# Patient Record
Sex: MALE | Race: OTHER | Employment: OTHER | URBAN - METROPOLITAN AREA
[De-identification: names, ages, dates, MRNs, and addresses within clinical notes are randomized per-mention and may not be internally consistent; named-entity substitution may affect disease eponyms.]

---

## 2017-01-09 ENCOUNTER — ANESTHESIA EVENT (OUTPATIENT)
Dept: CARDIAC SURGERY | Facility: HOSPITAL | Age: 52
DRG: 269 | End: 2017-01-09
Payer: COMMERCIAL

## 2017-01-09 ENCOUNTER — HOSPITAL ENCOUNTER (INPATIENT)
Facility: HOSPITAL | Age: 52
LOS: 2 days | Discharge: HOME | DRG: 269 | End: 2017-01-11
Attending: EMERGENCY MEDICINE | Admitting: SURGERY
Payer: COMMERCIAL

## 2017-01-09 ENCOUNTER — ANESTHESIA (OUTPATIENT)
Dept: CARDIAC SURGERY | Facility: HOSPITAL | Age: 52
DRG: 269 | End: 2017-01-09
Payer: COMMERCIAL

## 2017-01-09 ENCOUNTER — SURGERY (OUTPATIENT)
Age: 52
End: 2017-01-09

## 2017-01-09 ENCOUNTER — APPOINTMENT (OUTPATIENT)
Dept: ULTRASOUND IMAGING | Facility: HOSPITAL | Age: 52
DRG: 269 | End: 2017-01-09
Attending: EMERGENCY MEDICINE
Payer: COMMERCIAL

## 2017-01-09 ENCOUNTER — APPOINTMENT (OUTPATIENT)
Dept: CT IMAGING | Facility: HOSPITAL | Age: 52
DRG: 269 | End: 2017-01-09
Attending: EMERGENCY MEDICINE
Payer: COMMERCIAL

## 2017-01-09 ENCOUNTER — APPOINTMENT (OUTPATIENT)
Dept: GENERAL RADIOLOGY | Facility: HOSPITAL | Age: 52
DRG: 269 | End: 2017-01-09
Attending: EMERGENCY MEDICINE
Payer: COMMERCIAL

## 2017-01-09 DIAGNOSIS — M54.9 INTRACTABLE BACK PAIN: ICD-10-CM

## 2017-01-09 DIAGNOSIS — M54.16 LUMBAR RADICULOPATHY: Primary | ICD-10-CM

## 2017-01-09 DIAGNOSIS — I71.4 ABDOMINAL AORTIC ANEURYSM (AAA) WITHOUT RUPTURE (HCC): ICD-10-CM

## 2017-01-09 PROBLEM — I71.40 ABDOMINAL AORTIC ANEURYSM (AAA) WITHOUT RUPTURE: Status: ACTIVE | Noted: 2017-01-09

## 2017-01-09 PROBLEM — T88.4XXA DIFFICULT INTUBATION: Status: ACTIVE | Noted: 2017-01-09

## 2017-01-09 LAB
ALBUMIN SERPL-MCNC: 3.6 G/DL (ref 3.5–4.8)
ALP LIVER SERPL-CCNC: 75 U/L (ref 45–117)
ALT SERPL-CCNC: 35 U/L (ref 17–63)
ANTIBODY SCREEN: NEGATIVE
AST SERPL-CCNC: 20 U/L (ref 15–41)
BASOPHILS # BLD AUTO: 0.09 X10(3) UL (ref 0–0.1)
BASOPHILS NFR BLD AUTO: 1.2 %
BILIRUB SERPL-MCNC: 0.4 MG/DL (ref 0.1–2)
BUN BLD-MCNC: 15 MG/DL (ref 8–20)
CALCIUM BLD-MCNC: 9.2 MG/DL (ref 8.3–10.3)
CHLORIDE: 104 MMOL/L (ref 101–111)
CO2: 25 MMOL/L (ref 22–32)
CREAT BLD-MCNC: 0.94 MG/DL (ref 0.7–1.3)
EOSINOPHIL # BLD AUTO: 0.18 X10(3) UL (ref 0–0.3)
EOSINOPHIL NFR BLD AUTO: 2.4 %
ERYTHROCYTE [DISTWIDTH] IN BLOOD BY AUTOMATED COUNT: 13 % (ref 11.5–16)
GLUCOSE BLD-MCNC: 127 MG/DL (ref 70–99)
HCT VFR BLD AUTO: 48.2 % (ref 37–53)
HGB BLD-MCNC: 16.3 G/DL (ref 13–17)
IMMATURE GRANULOCYTE COUNT: 0.02 X10(3) UL (ref 0–1)
IMMATURE GRANULOCYTE RATIO %: 0.3 %
LYMPHOCYTES # BLD AUTO: 1.78 X10(3) UL (ref 0.9–4)
LYMPHOCYTES NFR BLD AUTO: 23.8 %
M PROTEIN MFR SERPL ELPH: 7 G/DL (ref 6.1–8.3)
MCH RBC QN AUTO: 32 PG (ref 27–33.2)
MCHC RBC AUTO-ENTMCNC: 33.8 G/DL (ref 31–37)
MCV RBC AUTO: 94.5 FL (ref 80–99)
MONOCYTES # BLD AUTO: 0.6 X10(3) UL (ref 0.1–0.6)
MONOCYTES NFR BLD AUTO: 8 %
NEUTROPHIL ABS PRELIM: 4.82 X10 (3) UL (ref 1.3–6.7)
NEUTROPHILS # BLD AUTO: 4.82 X10(3) UL (ref 1.3–6.7)
NEUTROPHILS NFR BLD AUTO: 64.3 %
PLATELET # BLD AUTO: 202 10(3)UL (ref 150–450)
POTASSIUM SERPL-SCNC: 3.7 MMOL/L (ref 3.6–5.1)
RBC # BLD AUTO: 5.1 X10(6)UL (ref 4.3–5.7)
RED CELL DISTRIBUTION WIDTH-SD: 45.1 FL (ref 35.1–46.3)
RH BLOOD TYPE: POSITIVE
SODIUM SERPL-SCNC: 136 MMOL/L (ref 136–144)
WBC # BLD AUTO: 7.5 X10(3) UL (ref 4–13)

## 2017-01-09 PROCEDURE — 86920 COMPATIBILITY TEST SPIN: CPT

## 2017-01-09 PROCEDURE — 93010 ELECTROCARDIOGRAM REPORT: CPT | Performed by: INTERNAL MEDICINE

## 2017-01-09 PROCEDURE — 93971 EXTREMITY STUDY: CPT

## 2017-01-09 PROCEDURE — 86850 RBC ANTIBODY SCREEN: CPT | Performed by: SURGERY

## 2017-01-09 PROCEDURE — 96376 TX/PRO/DX INJ SAME DRUG ADON: CPT

## 2017-01-09 PROCEDURE — 80053 COMPREHEN METABOLIC PANEL: CPT | Performed by: EMERGENCY MEDICINE

## 2017-01-09 PROCEDURE — 86901 BLOOD TYPING SEROLOGIC RH(D): CPT | Performed by: SURGERY

## 2017-01-09 PROCEDURE — 99285 EMERGENCY DEPT VISIT HI MDM: CPT

## 2017-01-09 PROCEDURE — 93005 ELECTROCARDIOGRAM TRACING: CPT

## 2017-01-09 PROCEDURE — 74177 CT ABD & PELVIS W/CONTRAST: CPT

## 2017-01-09 PROCEDURE — 04V03DZ RESTRICTION OF ABDOMINAL AORTA WITH INTRALUMINAL DEVICE, PERCUTANEOUS APPROACH: ICD-10-PCS | Performed by: SURGERY

## 2017-01-09 PROCEDURE — 86900 BLOOD TYPING SEROLOGIC ABO: CPT | Performed by: SURGERY

## 2017-01-09 PROCEDURE — 72110 X-RAY EXAM L-2 SPINE 4/>VWS: CPT

## 2017-01-09 PROCEDURE — 85025 COMPLETE CBC W/AUTO DIFF WBC: CPT | Performed by: EMERGENCY MEDICINE

## 2017-01-09 PROCEDURE — 96374 THER/PROPH/DIAG INJ IV PUSH: CPT

## 2017-01-09 PROCEDURE — 96375 TX/PRO/DX INJ NEW DRUG ADDON: CPT

## 2017-01-09 DEVICE — ARISTA AH ABSORBABLE HEMOSTATIC PARTICLES
Type: IMPLANTABLE DEVICE | Status: FUNCTIONAL
Brand: ARISTA™ AH

## 2017-01-09 RX ORDER — HYDROMORPHONE HYDROCHLORIDE 1 MG/ML
0.5 INJECTION, SOLUTION INTRAMUSCULAR; INTRAVENOUS; SUBCUTANEOUS EVERY 30 MIN PRN
Status: ACTIVE | OUTPATIENT
Start: 2017-01-09 | End: 2017-01-09

## 2017-01-09 RX ORDER — BISACODYL 10 MG
10 SUPPOSITORY, RECTAL RECTAL
Status: DISCONTINUED | OUTPATIENT
Start: 2017-01-09 | End: 2017-01-11

## 2017-01-09 RX ORDER — METHYLPREDNISOLONE 4 MG/1
4 TABLET ORAL
Status: DISCONTINUED | OUTPATIENT
Start: 2017-01-10 | End: 2017-01-11

## 2017-01-09 RX ORDER — POLYETHYLENE GLYCOL 3350 17 G/17G
17 POWDER, FOR SOLUTION ORAL DAILY PRN
Status: DISCONTINUED | OUTPATIENT
Start: 2017-01-09 | End: 2017-01-11

## 2017-01-09 RX ORDER — DOCUSATE SODIUM 100 MG/1
100 CAPSULE, LIQUID FILLED ORAL 2 TIMES DAILY
Status: DISCONTINUED | OUTPATIENT
Start: 2017-01-09 | End: 2017-01-11

## 2017-01-09 RX ORDER — SODIUM PHOSPHATE, DIBASIC AND SODIUM PHOSPHATE, MONOBASIC 7; 19 G/133ML; G/133ML
1 ENEMA RECTAL ONCE AS NEEDED
Status: ACTIVE | OUTPATIENT
Start: 2017-01-09 | End: 2017-01-09

## 2017-01-09 RX ORDER — SODIUM CHLORIDE 9 MG/ML
INJECTION, SOLUTION INTRAVENOUS ONCE
Status: DISCONTINUED | OUTPATIENT
Start: 2017-01-09 | End: 2017-01-11

## 2017-01-09 RX ORDER — HYDROCODONE BITARTRATE AND ACETAMINOPHEN 5; 325 MG/1; MG/1
2 TABLET ORAL EVERY 4 HOURS PRN
Status: DISCONTINUED | OUTPATIENT
Start: 2017-01-09 | End: 2017-01-11

## 2017-01-09 RX ORDER — HYDROMORPHONE HYDROCHLORIDE 1 MG/ML
1 INJECTION, SOLUTION INTRAMUSCULAR; INTRAVENOUS; SUBCUTANEOUS EVERY 30 MIN PRN
Status: COMPLETED | OUTPATIENT
Start: 2017-01-09 | End: 2017-01-09

## 2017-01-09 RX ORDER — METHYLPREDNISOLONE 4 MG/1
8 TABLET ORAL
Status: COMPLETED | OUTPATIENT
Start: 2017-01-09 | End: 2017-01-09

## 2017-01-09 RX ORDER — CYCLOBENZAPRINE HCL 10 MG
10 TABLET ORAL 3 TIMES DAILY PRN
Status: DISCONTINUED | OUTPATIENT
Start: 2017-01-09 | End: 2017-01-11

## 2017-01-09 RX ORDER — ONDANSETRON 2 MG/ML
4 INJECTION INTRAMUSCULAR; INTRAVENOUS EVERY 4 HOURS PRN
Status: DISCONTINUED | OUTPATIENT
Start: 2017-01-09 | End: 2017-01-10

## 2017-01-09 RX ORDER — CEFAZOLIN SODIUM 1 G/3ML
INJECTION, POWDER, FOR SOLUTION INTRAMUSCULAR; INTRAVENOUS
Status: DISCONTINUED | OUTPATIENT
Start: 2017-01-09 | End: 2017-01-10 | Stop reason: HOSPADM

## 2017-01-09 RX ORDER — IBUPROFEN 400 MG/1
400 TABLET ORAL EVERY 6 HOURS PRN
COMMUNITY
End: 2017-02-24 | Stop reason: DRUGHIGH

## 2017-01-09 RX ORDER — METHYLPREDNISOLONE 4 MG/1
4 TABLET ORAL
Status: DISCONTINUED | OUTPATIENT
Start: 2017-01-09 | End: 2017-01-11

## 2017-01-09 RX ORDER — HYDROCODONE BITARTRATE AND ACETAMINOPHEN 5; 325 MG/1; MG/1
1 TABLET ORAL EVERY 4 HOURS PRN
Status: DISCONTINUED | OUTPATIENT
Start: 2017-01-09 | End: 2017-01-11

## 2017-01-09 RX ORDER — DIAZEPAM 5 MG/ML
5 INJECTION, SOLUTION INTRAMUSCULAR; INTRAVENOUS ONCE
Status: COMPLETED | OUTPATIENT
Start: 2017-01-09 | End: 2017-01-09

## 2017-01-09 RX ORDER — DEXAMETHASONE SODIUM PHOSPHATE 4 MG/ML
10 VIAL (ML) INJECTION ONCE
Status: COMPLETED | OUTPATIENT
Start: 2017-01-09 | End: 2017-01-09

## 2017-01-09 RX ORDER — METHYLPREDNISOLONE 4 MG/1
4 TABLET ORAL NIGHTLY
Status: DISCONTINUED | OUTPATIENT
Start: 2017-01-11 | End: 2017-01-11

## 2017-01-09 RX ORDER — METHYLPREDNISOLONE 4 MG/1
8 TABLET ORAL NIGHTLY
Status: DISCONTINUED | OUTPATIENT
Start: 2017-01-09 | End: 2017-01-11

## 2017-01-09 RX ORDER — ACETAMINOPHEN 325 MG/1
650 TABLET ORAL EVERY 4 HOURS PRN
Status: DISCONTINUED | OUTPATIENT
Start: 2017-01-09 | End: 2017-01-11

## 2017-01-09 RX ORDER — SODIUM CHLORIDE 9 MG/ML
INJECTION, SOLUTION INTRAVENOUS CONTINUOUS
Status: ACTIVE | OUTPATIENT
Start: 2017-01-09 | End: 2017-01-09

## 2017-01-09 RX ORDER — ENOXAPARIN SODIUM 100 MG/ML
70 INJECTION SUBCUTANEOUS DAILY
Status: DISCONTINUED | OUTPATIENT
Start: 2017-01-09 | End: 2017-01-09

## 2017-01-09 NOTE — ED INITIAL ASSESSMENT (HPI)
Pt c/o L leg pain starting yesterday, pt denies truama or injury, pt sts pain increasing through today. Denies numbness or tingling to lower extremity.

## 2017-01-09 NOTE — PROGRESS NOTES
[facility] Progress Note:  Anticoagulation Weight Dose Adjustment for enoxaparin (LOVENOX)    Abraham Littlejohn is a 46year old male who has been prescribed enoxaparin (LOVENOX) 40 mg sq q 24 hrs for VTE prophylaxis.       Estimated Creatinine Clearan

## 2017-01-09 NOTE — ED NOTES
Xray testing done and completed. Pt states he still has pain. Awaiting to go to ultrasound department.

## 2017-01-09 NOTE — ED PROVIDER NOTES
Patient Seen in: [facility] Emergency Department    History   Patient presents with:  Lower Extremity Injury (musculoskeletal)    Stated Complaint: left leg pain    HPI    Patient is a 53-year-old with a history of arthritis, hypertension who presen above.    PSFH elements reviewed from today and agreed except as otherwise stated in HPI.     Physical Exam       ED Triage Vitals   BP 01/09/17 1030 143/95 mmHg   Pulse 01/09/17 1030 80   Resp 01/09/17 1030 18   Temp 01/09/17 1030 97.8 °F (36.6 °C)   Temp lower extremity venous Doppler: No DVT. EXTREMITY EXAMINED:  Left  SAPHENOFEMORAL JUNCTION:  No reflux. THROMBI:  None visible. COMPRESSION:  Normal compressibility, phasicity, and augmentation. OTHER:  Negative.     Lumbar spine: Osteoarthritic changes (primary encounter diagnosis)  Intractable back pain  Abdominal aortic aneurysm (AAA) without rupture ([facility] [address]    Disposition:  Admit    Follow-up:  No follow-up provider specified.     Medications Prescribed:  Current Discharge Medication List        Present

## 2017-01-10 LAB
APTT PPP: 30.4 SECONDS (ref 25–34)
ARTERIAL BLD GAS O2 SATURATION: 93 % (ref 92–100)
ARTERIAL BLD GAS O2 SATURATION: 94 % (ref 92–100)
ARTERIAL BLD GAS O2 SATURATION: 96 % (ref 92–100)
ARTERIAL BLOOD GAS BASE EXCESS: -3.2
ARTERIAL BLOOD GAS BASE EXCESS: -4
ARTERIAL BLOOD GAS BASE EXCESS: -4
ARTERIAL BLOOD GAS HCO3: 21.9 MEQ/L (ref 22–26)
ARTERIAL BLOOD GAS HCO3: 21.9 MEQ/L (ref 22–26)
ARTERIAL BLOOD GAS HCO3: 23.1 MEQ/L (ref 22–26)
ARTERIAL BLOOD GAS PCO2: 39 MM HG (ref 35–45)
ARTERIAL BLOOD GAS PCO2: 41 MM HG (ref 35–45)
ARTERIAL BLOOD GAS PCO2: 50 MM HG (ref 35–45)
ARTERIAL BLOOD GAS PH: 7.28 (ref 7.35–7.45)
ARTERIAL BLOOD GAS PH: 7.34 (ref 7.35–7.45)
ARTERIAL BLOOD GAS PH: 7.37 (ref 7.35–7.45)
ARTERIAL BLOOD GAS PO2: 74 MM HG (ref 80–105)
ARTERIAL BLOOD GAS PO2: 76 MM HG (ref 80–105)
ARTERIAL BLOOD GAS PO2: 97 MM HG (ref 80–105)
ATRIAL RATE: 70 BPM
ATRIAL RATE: 93 BPM
BASOPHILS # BLD AUTO: 0.02 X10(3) UL (ref 0–0.1)
BASOPHILS NFR BLD AUTO: 0.2 %
BUN BLD-MCNC: 17 MG/DL (ref 8–20)
CALCIUM BLD-MCNC: 7.5 MG/DL (ref 8.3–10.3)
CALCULATED O2 SATURATION: 92 % (ref 92–100)
CALCULATED O2 SATURATION: 95 % (ref 92–100)
CALCULATED O2 SATURATION: 97 % (ref 92–100)
CARBOXYHEMOGLOBIN: 1 % SAT (ref 0–3)
CARBOXYHEMOGLOBIN: 1.1 % SAT (ref 0–3)
CARBOXYHEMOGLOBIN: 1.2 % SAT (ref 0–3)
CHLORIDE: 105 MMOL/L (ref 101–111)
CO2: 23 MMOL/L (ref 22–32)
CREAT BLD-MCNC: 0.95 MG/DL (ref 0.7–1.3)
EOSINOPHIL # BLD AUTO: 0 X10(3) UL (ref 0–0.3)
EOSINOPHIL NFR BLD AUTO: 0 %
ERYTHROCYTE [DISTWIDTH] IN BLOOD BY AUTOMATED COUNT: 12.8 % (ref 11.5–16)
ERYTHROCYTE [DISTWIDTH] IN BLOOD BY AUTOMATED COUNT: 13.2 % (ref 11.5–16)
FIO2: 50 %
FIO2: 50 %
FIO2: 60 %
GLUCOSE BLD-MCNC: 115 MG/DL (ref 65–99)
GLUCOSE BLD-MCNC: 189 MG/DL (ref 70–99)
HCT VFR BLD AUTO: 41.5 % (ref 37–53)
HCT VFR BLD AUTO: 42.9 % (ref 37–53)
HGB BLD-MCNC: 14.5 G/DL (ref 13–17)
HGB BLD-MCNC: 14.5 G/DL (ref 13–17)
IMMATURE GRANULOCYTE COUNT: 0.09 X10(3) UL (ref 0–1)
IMMATURE GRANULOCYTE RATIO %: 0.7 %
INR BLD: 1.04 (ref 0.89–1.12)
LYMPHOCYTES # BLD AUTO: 0.99 X10(3) UL (ref 0.9–4)
LYMPHOCYTES NFR BLD AUTO: 7.6 %
MCH RBC QN AUTO: 32.6 PG (ref 27–33.2)
MCH RBC QN AUTO: 33.3 PG (ref 27–33.2)
MCHC RBC AUTO-ENTMCNC: 33.8 G/DL (ref 31–37)
MCHC RBC AUTO-ENTMCNC: 34.9 G/DL (ref 31–37)
MCV RBC AUTO: 95.2 FL (ref 80–99)
MCV RBC AUTO: 96.4 FL (ref 80–99)
METHEMOGLOBIN: 0.6 % SAT (ref 0.4–1.5)
MONOCYTES # BLD AUTO: 0.56 X10(3) UL (ref 0.1–0.6)
MONOCYTES NFR BLD AUTO: 4.3 %
NEUTROPHIL ABS PRELIM: 11.39 X10 (3) UL (ref 1.3–6.7)
NEUTROPHILS # BLD AUTO: 11.39 X10(3) UL (ref 1.3–6.7)
NEUTROPHILS NFR BLD AUTO: 87.2 %
P AXIS: 37 DEGREES
P AXIS: 47 DEGREES
P-R INTERVAL: 174 MS
P-R INTERVAL: 176 MS
PATIENT TEMPERATURE: 97.3 F
PATIENT TEMPERATURE: 97.6 F
PATIENT TEMPERATURE: 98.6 F
PEEP: 5 CM H2O
PLATELET # BLD AUTO: 161 10(3)UL (ref 150–450)
PLATELET # BLD AUTO: 169 10(3)UL (ref 150–450)
POTASSIUM SERPL-SCNC: 4.3 MMOL/L (ref 3.6–5.1)
PRESSURE SUPPORT: 5 CM H2O
PSA SERPL DL<=0.01 NG/ML-MCNC: 13.9 SECONDS (ref 12.3–14.8)
Q-T INTERVAL: 368 MS
Q-T INTERVAL: 414 MS
QRS DURATION: 110 MS
QRS DURATION: 98 MS
QTC CALCULATION (BEZET): 447 MS
QTC CALCULATION (BEZET): 457 MS
R AXIS: -16 DEGREES
R AXIS: -32 DEGREES
RBC # BLD AUTO: 4.36 X10(6)UL (ref 4.3–5.7)
RBC # BLD AUTO: 4.45 X10(6)UL (ref 4.3–5.7)
RED CELL DISTRIBUTION WIDTH-SD: 45 FL (ref 35.1–46.3)
RED CELL DISTRIBUTION WIDTH-SD: 46.5 FL (ref 35.1–46.3)
SODIUM SERPL-SCNC: 137 MMOL/L (ref 136–144)
T AXIS: -23 DEGREES
T AXIS: 10 DEGREES
TIDAL VOLUME: 600 ML
TIDAL VOLUME: 750 ML
TOTAL HEMOGLOBIN: 14.3 G/DL (ref 13.2–17.3)
TOTAL HEMOGLOBIN: 14.4 G/DL (ref 13.2–17.3)
TOTAL HEMOGLOBIN: 14.7 G/DL (ref 13.2–17.3)
VENT RATE: 12 /MIN
VENT RATE: 12 /MIN
VENTRICULAR RATE: 70 BPM
VENTRICULAR RATE: 93 BPM
WBC # BLD AUTO: 11 X10(3) UL (ref 4–13)
WBC # BLD AUTO: 13.1 X10(3) UL (ref 4–13)

## 2017-01-10 PROCEDURE — 97530 THERAPEUTIC ACTIVITIES: CPT

## 2017-01-10 PROCEDURE — 94660 CPAP INITIATION&MGMT: CPT

## 2017-01-10 PROCEDURE — 93010 ELECTROCARDIOGRAM REPORT: CPT | Performed by: INTERNAL MEDICINE

## 2017-01-10 PROCEDURE — 82375 ASSAY CARBOXYHB QUANT: CPT | Performed by: INTERNAL MEDICINE

## 2017-01-10 PROCEDURE — 87081 CULTURE SCREEN ONLY: CPT | Performed by: SURGERY

## 2017-01-10 PROCEDURE — 82803 BLOOD GASES ANY COMBINATION: CPT | Performed by: INTERNAL MEDICINE

## 2017-01-10 PROCEDURE — 94002 VENT MGMT INPAT INIT DAY: CPT

## 2017-01-10 PROCEDURE — 85730 THROMBOPLASTIN TIME PARTIAL: CPT | Performed by: SURGERY

## 2017-01-10 PROCEDURE — 97166 OT EVAL MOD COMPLEX 45 MIN: CPT

## 2017-01-10 PROCEDURE — 85610 PROTHROMBIN TIME: CPT | Performed by: SURGERY

## 2017-01-10 PROCEDURE — 80048 BASIC METABOLIC PNL TOTAL CA: CPT | Performed by: SURGERY

## 2017-01-10 PROCEDURE — 97163 PT EVAL HIGH COMPLEX 45 MIN: CPT

## 2017-01-10 PROCEDURE — 94150 VITAL CAPACITY TEST: CPT

## 2017-01-10 PROCEDURE — 93005 ELECTROCARDIOGRAM TRACING: CPT

## 2017-01-10 PROCEDURE — 80048 BASIC METABOLIC PNL TOTAL CA: CPT | Performed by: INTERNAL MEDICINE

## 2017-01-10 PROCEDURE — 82962 GLUCOSE BLOOD TEST: CPT

## 2017-01-10 PROCEDURE — 85027 COMPLETE CBC AUTOMATED: CPT | Performed by: SURGERY

## 2017-01-10 PROCEDURE — 83050 HGB METHEMOGLOBIN QUAN: CPT | Performed by: INTERNAL MEDICINE

## 2017-01-10 PROCEDURE — 85018 HEMOGLOBIN: CPT | Performed by: INTERNAL MEDICINE

## 2017-01-10 PROCEDURE — 85025 COMPLETE CBC W/AUTO DIFF WBC: CPT | Performed by: INTERNAL MEDICINE

## 2017-01-10 RX ORDER — MORPHINE SULFATE 4 MG/ML
4 INJECTION, SOLUTION INTRAMUSCULAR; INTRAVENOUS
Status: DISCONTINUED | OUTPATIENT
Start: 2017-01-10 | End: 2017-01-11

## 2017-01-10 RX ORDER — METOCLOPRAMIDE HYDROCHLORIDE 5 MG/ML
10 INJECTION INTRAMUSCULAR; INTRAVENOUS AS NEEDED
Status: ACTIVE | OUTPATIENT
Start: 2017-01-10 | End: 2017-01-10

## 2017-01-10 RX ORDER — DEXMEDETOMIDINE HYDROCHLORIDE 4 UG/ML
INJECTION, SOLUTION INTRAVENOUS CONTINUOUS
Status: DISCONTINUED | OUTPATIENT
Start: 2017-01-10 | End: 2017-01-10

## 2017-01-10 RX ORDER — NALOXONE HYDROCHLORIDE 0.4 MG/ML
80 INJECTION, SOLUTION INTRAMUSCULAR; INTRAVENOUS; SUBCUTANEOUS AS NEEDED
Status: ACTIVE | OUTPATIENT
Start: 2017-01-10 | End: 2017-01-10

## 2017-01-10 RX ORDER — HYDROCODONE BITARTRATE AND ACETAMINOPHEN 10; 325 MG/1; MG/1
1 TABLET ORAL EVERY 6 HOURS PRN
Status: DISCONTINUED | OUTPATIENT
Start: 2017-01-10 | End: 2017-01-11

## 2017-01-10 RX ORDER — MEPERIDINE HYDROCHLORIDE 25 MG/ML
12.5 INJECTION INTRAMUSCULAR; INTRAVENOUS; SUBCUTANEOUS AS NEEDED
Status: ACTIVE | OUTPATIENT
Start: 2017-01-10 | End: 2017-01-10

## 2017-01-10 RX ORDER — ONDANSETRON 2 MG/ML
4 INJECTION INTRAMUSCULAR; INTRAVENOUS EVERY 6 HOURS PRN
Status: DISCONTINUED | OUTPATIENT
Start: 2017-01-10 | End: 2017-01-11

## 2017-01-10 RX ORDER — SODIUM CHLORIDE, SODIUM LACTATE, POTASSIUM CHLORIDE, CALCIUM CHLORIDE 600; 310; 30; 20 MG/100ML; MG/100ML; MG/100ML; MG/100ML
INJECTION, SOLUTION INTRAVENOUS CONTINUOUS
Status: DISCONTINUED | OUTPATIENT
Start: 2017-01-10 | End: 2017-01-11

## 2017-01-10 RX ORDER — NITROGLYCERIN 20 MG/100ML
INJECTION INTRAVENOUS CONTINUOUS
Status: DISCONTINUED | OUTPATIENT
Start: 2017-01-10 | End: 2017-01-11

## 2017-01-10 RX ORDER — MORPHINE SULFATE 4 MG/ML
8 INJECTION, SOLUTION INTRAMUSCULAR; INTRAVENOUS
Status: DISCONTINUED | OUTPATIENT
Start: 2017-01-10 | End: 2017-01-11

## 2017-01-10 RX ORDER — MIDAZOLAM HYDROCHLORIDE 1 MG/ML
0.5 INJECTION INTRAMUSCULAR; INTRAVENOUS EVERY 5 MIN PRN
Status: ACTIVE | OUTPATIENT
Start: 2017-01-10 | End: 2017-01-10

## 2017-01-10 RX ORDER — HYDROCODONE BITARTRATE AND ACETAMINOPHEN 10; 325 MG/1; MG/1
2 TABLET ORAL EVERY 6 HOURS PRN
Status: DISCONTINUED | OUTPATIENT
Start: 2017-01-10 | End: 2017-01-11

## 2017-01-10 RX ORDER — NALBUPHINE HCL 10 MG/ML
2.5 AMPUL (ML) INJECTION AS NEEDED
Status: ACTIVE | OUTPATIENT
Start: 2017-01-10 | End: 2017-01-10

## 2017-01-10 RX ORDER — ENOXAPARIN SODIUM 100 MG/ML
70 INJECTION SUBCUTANEOUS DAILY
Status: DISCONTINUED | OUTPATIENT
Start: 2017-01-10 | End: 2017-01-11

## 2017-01-10 RX ORDER — LABETALOL HYDROCHLORIDE 5 MG/ML
5 INJECTION, SOLUTION INTRAVENOUS EVERY 5 MIN PRN
Status: ACTIVE | OUTPATIENT
Start: 2017-01-10 | End: 2017-01-10

## 2017-01-10 RX ORDER — HYDROMORPHONE HYDROCHLORIDE 1 MG/ML
0.4 INJECTION, SOLUTION INTRAMUSCULAR; INTRAVENOUS; SUBCUTANEOUS EVERY 5 MIN PRN
Status: ACTIVE | OUTPATIENT
Start: 2017-01-10 | End: 2017-01-10

## 2017-01-10 RX ORDER — HYDROMORPHONE HYDROCHLORIDE 1 MG/ML
INJECTION, SOLUTION INTRAMUSCULAR; INTRAVENOUS; SUBCUTANEOUS
Status: COMPLETED
Start: 2017-01-10 | End: 2017-01-10

## 2017-01-10 RX ORDER — MORPHINE SULFATE 2 MG/ML
2 INJECTION, SOLUTION INTRAMUSCULAR; INTRAVENOUS
Status: DISCONTINUED | OUTPATIENT
Start: 2017-01-10 | End: 2017-01-11

## 2017-01-10 RX ORDER — ONDANSETRON 2 MG/ML
4 INJECTION INTRAMUSCULAR; INTRAVENOUS AS NEEDED
Status: ACTIVE | OUTPATIENT
Start: 2017-01-10 | End: 2017-01-10

## 2017-01-10 RX ORDER — DIPHENHYDRAMINE HYDROCHLORIDE 50 MG/ML
12.5 INJECTION INTRAMUSCULAR; INTRAVENOUS AS NEEDED
Status: ACTIVE | OUTPATIENT
Start: 2017-01-10 | End: 2017-01-10

## 2017-01-10 RX ORDER — SODIUM CHLORIDE 9 MG/ML
INJECTION, SOLUTION INTRAVENOUS CONTINUOUS
Status: DISCONTINUED | OUTPATIENT
Start: 2017-01-10 | End: 2017-01-10

## 2017-01-10 NOTE — CONSULTS
[address] Patient Status:  Inpatient    5/3/1965 MRN [id number]   [facility] 6NE-A Attending Delphine Sanchez MD   [facility] Day # 1 PCP Gavin Qiu MD     Date of Admission: 2017  Admission Diagnosis: 80 MG/0.8ML injection 70 mg, 70 mg, Subcutaneous, Daily  •  CeFAZolin Sodium (ANCEF) 3 g in sodium chloride 0.9% 100 mL IVPB, 3 g, Intravenous, Q8H  •  phenylephrine HCl (BERNARDINO-SYNEPHRINE) 50 mg in sodium chloride 0.9 % 250 mL infusion, 100-200 mcg/min, Intr (149.233 kg)  08/17/16 : 330 lb (149.687 kg)          I/O this shift:  In: 3304 [I.V.:3304]  Out: 1350 [Urine:850; Blood:500]     /84 mmHg  Pulse 64  Temp(Src) 97.8 °F (36.6 °C) (Temporal)  Resp 13  Ht 5' 6\" (1.676 m)  Wt 308 lb 10.3 oz (140 kg)  BM and with good renal function  -post op care per vascular surgery and hospitalist  -to floor when Ariel Underwood with vascular    Critical Care Time greater than: 35 minutes    Radha Keys MD  1/10/2017  9:25 AM

## 2017-01-10 NOTE — PHYSICAL THERAPY NOTE
PHYSICAL THERAPY EVALUATION - INPATIENT     Room Number: 3108/1028-C  Evaluation Date: 1/10/2017  Type of Evaluation: Initial  Physician Order: PT Eval and Treat    Presenting Problem: s/p endovascular AAA repair 1/9/17  Reason for Therapy: Mobility Dy Standing: Fair -  Dynamic Standing: Poor +    ADDITIONAL TESTS                                    NEUROLOGICAL FINDINGS                      ACTIVITY TOLERANCE  O2 Saturation at rest 99% and with activity 93%  O2 Device: Nasal cannula  Liters of O2:  2 Up in chair;Needs met;Call light within reach;RN aware of session/findings; All patient questions and concerns addressed    ASSESSMENT     Patient is a 46year old male admitted 1/9/2017 with B LE pain. Imaging reveals possible AA rupture.  Pt s/p endovascul

## 2017-01-10 NOTE — PROGRESS NOTES
Patient extubated this morning on BIPAP  Palpable DP pulse  Left incision clean and intact  Can transfer when stable from vent standpoint

## 2017-01-10 NOTE — OPERATIVE REPORT
[address] [facility]  Brief Op Note     [address] [employer] Location: OR   [facility] [id number] MRN [id number]   Admission Date 1/9/2017 Operation Date 1/9/2017   Attending Physician Sánchez Pruitt MD Operating Physician Cathy Mark MD accessed the right and left common femoral artery. I was able to preclosed the right common femoral artery and place an 8 Slovenian sheath.   I was unable to close preclosed the left common femoral artery and decided to cut down after the surgery to minimize we exchanged for a pigtail catheter and did a completion angiogram.  There was brisk filling of all vessels and no evidence of an endoleak.   On the right we removed the devices and secure the artery closure with the pro glides we placed in preclosed techni

## 2017-01-10 NOTE — OCCUPATIONAL THERAPY NOTE
OCCUPATIONAL THERAPY EVALUATION - INPATIENT     Room Number: 4714/0574-W  Evaluation Date: 1/10/2017  Type of Evaluation: Initial  Presenting Problem: Abdominal Aortic Aneurysm    Physician Order: IP Consult to Occupational Therapy  Reason for Therapy: ADL strength is within functional limits     COORDINATION  Gross Motor    [facility]    Fine Motor    [facility]      ADDITIONAL TESTS                                    NEUROLOGICAL FINDINGS  Neurological Findings: None                ACTIVITY TOLERANCE  O2 Device: Nasal c Device Recommendations: None    PLAN  OT Treatment Plan: Balance activities; Energy conservation/work simplification techniques;ADL training;IADL training;Functional transfer training;UE strengthening/ROM; Endurance training;Patient/Family education;Patient/

## 2017-01-10 NOTE — PLAN OF CARE
NURSING ADMISSION NOTE      Patient admitted via Cart  Oriented to room. Safety precautions initiated. Bed in low position. Call light in reach. Admission interview completed. Dr. Olu Chandler paged for admission orders.

## 2017-01-10 NOTE — PHYSICAL THERAPY NOTE
Order received for PT eval. Attempted to see Pt this am, however, Pt intubated and not appropriate for therapy. Will follow up 1/11/17.

## 2017-01-10 NOTE — BRIEF OP NOTE
[facility]  Brief Op Note     [address] [employer] Location: OR   [facility] [id number] MRN [id number]   Admission Date 1/9/2017 Operation Date 1/9/2017   Attending Physician Marija King MD Operating Physician Jose Bello MD

## 2017-01-10 NOTE — ANESTHESIA POSTPROCEDURE EVALUATION
[address] Patient Status:  Inpatient   Age/Gender 46year old male MRN [id number]   [facility] 6NE-A Attending Hattie Drew MD   [facility] Day # 1 PCP Greg Carroll MD       Anesthesia Post-op Note    Proced

## 2017-01-10 NOTE — H&P
[facility] Hospitalist History and Physical      Patient presents with:  Lower Extremity Injury (musculoskeletal)       PCP: Radha Foster MD      History of Present Illness: Patient is a 46year old male with PMH sig for ABIODUN, morbid obesity presents for e cervical or supraclavicular lymph adenopathy, thyroid: no enlargment/tenderness/nodules appreciated   Lungs:   Clear to auscultation bilaterally. Normal effort   Chest wall:  No tenderness or deformity.    Heart:  Regular rate and rhythm, S1, S2 normal, no osteophytes are stable. 1/9/2017  CONCLUSION:  No significant changes since prior exam. Moderate osteoarthritic changes at L5-S1 are stable.     Dictated by: Salina De Oliveira MD on 1/09/2017 at 12:02     Approved by: Salina De Oliveira MD             radiated to  the left abdominal side. CONTRAST USED:  100 cc of Omnipaque 350        FINDINGS:  LIVER:  No focal hepatic lesions are noted. There is hepatomegaly measuring 21.8 cm in AP dimension.  BILIARY:  Normal.  No visible dilatation or calcification common iliac vessels bilaterally. The aneurysm of the distal abdominal aorta measures 5.9 cm in greatest AP dimension. 3. Uncomplicated diverticular changes of the colon. 4. Multiple simple nonenhancing bilateral renal cysts. 5. Hepatomegaly measuring 21. 8

## 2017-01-10 NOTE — PAYOR COMM NOTE
Attending Physician: Alcon Kaiser MD    Review Type: ADMISSION   Reviewer: Riki Guerrero       Date: January 10, 2017 - 9:40 AM  Payor: RADHA RIVERO  Authorization Number: [id number]  Admit date: 1/9/2017 10:23 AM   Admitted from Emergency Dept.: yes Desi Juarez RN    1/10/2017 1089 New Bag 1.5 mcg/kg/hr × 140.6 kg Intravenous Malka Mantilla RN    1/10/2017 9854 Restarted 1.5 mcg/kg/hr × 140.6 kg Intravenous Malka Mantilla RN    1/10/2017 0400 Rate/Dose Change 0.5 mcg/kg/hr × 140.6 kg Intraveno Taryn Barnhart, RN      Thrombin 5000 UNITS spray kit     Date Action Dose Route User    1/9/2017 2310 Given 70427 Units Topical Tory Jaimes MD          RESULTS LAST 24HRS:  Labs Reviewed   COMP METABOLIC PANEL (14) - Abnormal; Notable for the following: results for these tests on the individual orders. CBC WITH DIFFERENTIAL WITH PLATELET    Narrative: The following orders were created for panel order CBC WITH DIFFERENTIAL WITH PLATELET.   Procedure                               Abnormality         St I discussed my concern for rupture and death. Will proceed with repair tonight. Indications, risks, benefits discussed, questions answered. Type and cross ordered.    Procedure Performed:    1. U/S guided access right and left common femoral artery - preclo

## 2017-01-10 NOTE — PLAN OF CARE
Diabetes/Glucose Control    • Glucose maintained within prescribed range Progressing        Patient/Family Goals    • Patient/Family Long Term Goal Progressing    • Patient/Family Short Term Goal Progressing        Safety Risk - Non-Violent Restraints    •

## 2017-01-10 NOTE — CONSULTS
[facility]  Vascular Surgery Consultation    Gomezprabhu Garciaen Patient Status:  Inpatient    5/3/1965 MRN [id number]   [facility] 0SW-A Attending Danita Young MD   Hosp Day # 0 PCP Ruslan Tillman MD     Reason for Con 5-325 MG per tab 2 tablet, 2 tablet, Oral, Q4H PRN  •  docusate sodium (COLACE) cap 100 mg, 100 mg, Oral, BID  •  PEG 3350 (MIRALAX) powder packet 17 g, 17 g, Oral, Daily PRN  •  magnesium hydroxide (MILK OF MAGNESIA) 400 MG/5ML suspension 30 mL, 30 mL, Or murmur, no murmur, no gallop   ABDOMEN: soft, non-tender with no palpable aneurysm or masses - large abdomen unable to palpate aneurysm due to habitus  BACK: normal, no tenderness  SKIN: no rashes, no suspicious lesions, warm and dry  EXTREMITIES: no edema

## 2017-01-10 NOTE — PLAN OF CARE
Received patient from OR at [address] [facility], intubated, sedated on precedex gtt, R radial art line, baires catheter.  Bilateral groin access, right groin perclosed, intact, no hematoma, left groin cut down, scant sanguinous drainage, no hematoma, bilateral pedal pulses

## 2017-01-10 NOTE — PLAN OF CARE
Pt. Received to room 8619. He is alert and oriented. Lungs clear on auscultation. Right groin puncture site is dry and intact and left groin is dry and intact. CMS to bilateral lower extremities intact. Pt. Has no c/o pain. He is sinus rhythm on monitor.

## 2017-01-10 NOTE — PROGRESS NOTES
I discussed with patient, wife and three sons my plan for aneurysm repair. I am concerned about posterior wall rupture due to haziness on ct near bifurcation. I discussed the indications, risks, benefits and plan for endovascular repair.  Discussed risk of

## 2017-01-10 NOTE — ANESTHESIA PREPROCEDURE EVALUATION
PRE-OP EVALUATION    Patient Name: Collins Escort    Pre-op Diagnosis: abdominal aortic aneurysm    Procedure(s):  I dont know laterality just picked bilateral  Radha Hubbard nsg supv     Surgeon(s) and Role:     Elisabet Sun MD - Primary    Pre-op vit 1/10/2017] methylPREDNISolone (MEDROL) tab 4 mg 4 mg Oral Before breakfast   [START ON 1/11/2017] methylPREDNISolone (MEDROL) tab 4 mg 4 mg Oral Nightly   Cyclobenzaprine HCl (cyclobenzaprine) tab 10 mg 10 mg Oral TID PRN   0.9%  NaCl infusion  Intravenous normal     Dental    No notable dental history. Pulmonary    Pulmonary exam normal.  Breath sounds clear to auscultation bilaterally. Other findings            ASA: 4 and emergent  Plan: general   Patient does not meet NPO guidelines.

## 2017-01-11 VITALS
RESPIRATION RATE: 18 BRPM | HEART RATE: 86 BPM | BODY MASS INDEX: 49.6 KG/M2 | SYSTOLIC BLOOD PRESSURE: 108 MMHG | TEMPERATURE: 100 F | HEIGHT: 66 IN | WEIGHT: 308.63 LBS | DIASTOLIC BLOOD PRESSURE: 42 MMHG | OXYGEN SATURATION: 96 %

## 2017-01-11 PROCEDURE — 87081 CULTURE SCREEN ONLY: CPT | Performed by: INTERNAL MEDICINE

## 2017-01-11 PROCEDURE — 94660 CPAP INITIATION&MGMT: CPT

## 2017-01-11 RX ORDER — ASPIRIN 81 MG/1
81 TABLET ORAL DAILY
Qty: 100 TABLET | Refills: 0 | Status: ON HOLD | OUTPATIENT
Start: 2017-01-11 | End: 2017-03-01

## 2017-01-11 RX ORDER — DOCUSATE SODIUM 100 MG/1
100 CAPSULE, LIQUID FILLED ORAL 2 TIMES DAILY
Status: DISCONTINUED | OUTPATIENT
Start: 2017-01-11 | End: 2017-01-11

## 2017-01-11 RX ORDER — HYDROCODONE BITARTRATE AND ACETAMINOPHEN 10; 325 MG/1; MG/1
1 TABLET ORAL EVERY 6 HOURS PRN
Qty: 60 TABLET | Refills: 0 | Status: ON HOLD | OUTPATIENT
Start: 2017-01-11 | End: 2017-03-01

## 2017-01-11 RX ORDER — DOCUSATE SODIUM 100 MG/1
100 CAPSULE, LIQUID FILLED ORAL 2 TIMES DAILY
Qty: 100 CAPSULE | Refills: 0 | Status: SHIPPED | OUTPATIENT
Start: 2017-01-11 | End: 2017-07-13 | Stop reason: ALTCHOICE

## 2017-01-11 NOTE — PLAN OF CARE
Pt discharged home. IV removed, tele dc'd and returned to monitor tech. F/U instructions provided and discussed. Pt and family v/u. Rx given. Discussed adverse reactions and side effects of all new medication and provided appropriate handouts.  Pt and famil

## 2017-01-11 NOTE — RESPIRATORY THERAPY NOTE
ABIODUN - Equipment Use Daily Summary:                  . Set Mode: AUTO CPAP WITH C-FLEX                . Usage in hours: 5:13                . 90% Pressure (EPAP) level: 11.8                . 90% Insp. Pressure (IPAP): Abhishek Angles  AHI: 0.8

## 2017-01-11 NOTE — DISCHARGE SUMMARY
General Medicine Discharge Summary     Patient ID:  Lisa Abdul [id number]  89 year old  5/3/1965    Admit date: 1/9/2017    Discharge date and time:1/11/2017    Attending Physician: Rafal Jennings MD needed. docusate sodium 100 MG Oral Cap  Take 1 capsule (100 mg total) by mouth 2 (two) times daily. aspirin 81 MG Oral Tab EC  Take 1 tablet (81 mg total) by mouth daily.       CONTINUE these medications which have NOT CHANGED    ibuprofen 400 MG Ora

## 2017-01-11 NOTE — PLAN OF CARE
Problem: Patient/Family Goals  Goal: Patient/Family Long Term Goal  Patient’s Long Term Goal: Discharge    Interventions:  -AAA repair  -PT/OT  -SLP  -Incision care  -PO steroid  - See additional Care Plan goals for specific interventions   Outcome: Progre

## 2017-01-11 NOTE — PLAN OF CARE
CARDIOVASCULAR - ADULT    • Maintains optimal cardiac output and hemodynamic stability Progressing    • Absence of cardiac arrhythmias or at baseline Progressing        Impaired Activities of Daily Living    • Achieve highest/safest level of independence i

## 2017-01-11 NOTE — PROGRESS NOTES
[address] Patient Status:  Inpatient    5/3/1965 MRN [id number]   [facility] 8NE-A Attending Alondra Logan MD   [facility] Day # 2 PCP Sarina Conner MD     SUBJECTIVE:he looks freat     OBJECTIVE:  /4 PRN  •  magnesium hydroxide (MILK OF MAGNESIA) 400 MG/5ML suspension 30 mL, 30 mL, Oral, Daily PRN  •  bisacodyl (DULCOLAX) rectal suppository 10 mg, 10 mg, Rectal, Daily PRN  •  methylPREDNISolone (MEDROL) tab 4 mg, 4 mg, Oral, After lunch  •  methylPREDN

## 2017-01-13 LAB — BLOOD TYPE BARCODE: 6200

## 2017-02-27 ENCOUNTER — HOSPITAL ENCOUNTER (INPATIENT)
Facility: HOSPITAL | Age: 52
LOS: 2 days | Discharge: HOME | DRG: 270 | End: 2017-03-01
Attending: SURGERY | Admitting: SURGERY
Payer: COMMERCIAL

## 2017-02-27 ENCOUNTER — SURGERY (OUTPATIENT)
Age: 52
End: 2017-02-27

## 2017-02-27 ENCOUNTER — APPOINTMENT (OUTPATIENT)
Dept: GENERAL RADIOLOGY | Facility: HOSPITAL | Age: 52
DRG: 270 | End: 2017-02-27
Attending: SURGERY
Payer: COMMERCIAL

## 2017-02-27 PROBLEM — IMO0002 ENDOLEAK POST (EVAR) ENDOVASCULAR ANEURYSM REPAIR: Status: ACTIVE | Noted: 2017-02-27

## 2017-02-27 LAB
ALBUMIN SERPL-MCNC: 3.5 G/DL (ref 3.5–4.8)
ALP LIVER SERPL-CCNC: 95 U/L (ref 45–117)
ALT SERPL-CCNC: 26 U/L (ref 17–63)
ANTIBODY SCREEN: NEGATIVE
APTT PPP: 28.1 SECONDS (ref 25–34)
AST SERPL-CCNC: 14 U/L (ref 15–41)
ATRIAL RATE: 62 BPM
BASOPHILS # BLD AUTO: 0.06 X10(3) UL (ref 0–0.1)
BASOPHILS NFR BLD AUTO: 0.7 %
BILIRUB SERPL-MCNC: 0.3 MG/DL (ref 0.1–2)
BUN BLD-MCNC: 22 MG/DL (ref 8–20)
CALCIUM BLD-MCNC: 9.1 MG/DL (ref 8.3–10.3)
CHLORIDE: 103 MMOL/L (ref 101–111)
CO2: 25 MMOL/L (ref 22–32)
CREAT BLD-MCNC: 0.8 MG/DL (ref 0.7–1.3)
EOSINOPHIL # BLD AUTO: 0.2 X10(3) UL (ref 0–0.3)
EOSINOPHIL NFR BLD AUTO: 2.5 %
ERYTHROCYTE [DISTWIDTH] IN BLOOD BY AUTOMATED COUNT: 13.1 % (ref 11.5–16)
ERYTHROCYTE [DISTWIDTH] IN BLOOD BY AUTOMATED COUNT: 13.2 % (ref 11.5–16)
GLUCOSE BLD-MCNC: 100 MG/DL (ref 70–99)
HCT VFR BLD AUTO: 39.2 % (ref 37–53)
HCT VFR BLD AUTO: 44.4 % (ref 37–53)
HGB BLD-MCNC: 13.2 G/DL (ref 13–17)
HGB BLD-MCNC: 14.7 G/DL (ref 13–17)
IMMATURE GRANULOCYTE COUNT: 0.02 X10(3) UL (ref 0–1)
IMMATURE GRANULOCYTE RATIO %: 0.2 %
INR BLD: 0.94 (ref 0.89–1.12)
INR BLD: 1.01 (ref 0.89–1.12)
LYMPHOCYTES # BLD AUTO: 2.14 X10(3) UL (ref 0.9–4)
LYMPHOCYTES NFR BLD AUTO: 26.7 %
M PROTEIN MFR SERPL ELPH: 7.9 G/DL (ref 6.1–8.3)
MCH RBC QN AUTO: 31.3 PG (ref 27–33.2)
MCH RBC QN AUTO: 31.4 PG (ref 27–33.2)
MCHC RBC AUTO-ENTMCNC: 33.1 G/DL (ref 31–37)
MCHC RBC AUTO-ENTMCNC: 33.7 G/DL (ref 31–37)
MCV RBC AUTO: 93.3 FL (ref 80–99)
MCV RBC AUTO: 94.7 FL (ref 80–99)
MONOCYTES # BLD AUTO: 0.54 X10(3) UL (ref 0.1–0.6)
MONOCYTES NFR BLD AUTO: 6.7 %
NEUTROPHIL ABS PRELIM: 5.07 X10 (3) UL (ref 1.3–6.7)
NEUTROPHILS # BLD AUTO: 5.07 X10(3) UL (ref 1.3–6.7)
NEUTROPHILS NFR BLD AUTO: 63.2 %
P AXIS: 41 DEGREES
P-R INTERVAL: 170 MS
PLATELET # BLD AUTO: 215 10(3)UL (ref 150–450)
PLATELET # BLD AUTO: 255 10(3)UL (ref 150–450)
POTASSIUM SERPL-SCNC: 4 MMOL/L (ref 3.6–5.1)
PSA SERPL DL<=0.01 NG/ML-MCNC: 12.8 SECONDS (ref 12.3–14.8)
PSA SERPL DL<=0.01 NG/ML-MCNC: 13.6 SECONDS (ref 12.3–14.8)
Q-T INTERVAL: 432 MS
QRS DURATION: 100 MS
QTC CALCULATION (BEZET): 438 MS
R AXIS: -16 DEGREES
RBC # BLD AUTO: 4.2 X10(6)UL (ref 4.3–5.7)
RBC # BLD AUTO: 4.69 X10(6)UL (ref 4.3–5.7)
RED CELL DISTRIBUTION WIDTH-SD: 44.6 FL (ref 35.1–46.3)
RED CELL DISTRIBUTION WIDTH-SD: 45.1 FL (ref 35.1–46.3)
RH BLOOD TYPE: POSITIVE
SODIUM SERPL-SCNC: 138 MMOL/L (ref 136–144)
T AXIS: -14 DEGREES
VENTRICULAR RATE: 62 BPM
WBC # BLD AUTO: 11.2 X10(3) UL (ref 4–13)
WBC # BLD AUTO: 8 X10(3) UL (ref 4–13)

## 2017-02-27 PROCEDURE — 82330 ASSAY OF CALCIUM: CPT

## 2017-02-27 PROCEDURE — 047C3ZZ DILATION OF RIGHT COMMON ILIAC ARTERY, PERCUTANEOUS APPROACH: ICD-10-PCS | Performed by: SURGERY

## 2017-02-27 PROCEDURE — 85730 THROMBOPLASTIN TIME PARTIAL: CPT | Performed by: SURGERY

## 2017-02-27 PROCEDURE — B41DYZZ FLUOROSCOPY OF AORTA AND BILATERAL LOWER EXTREMITY ARTERIES USING OTHER CONTRAST: ICD-10-PCS | Performed by: SURGERY

## 2017-02-27 PROCEDURE — 80053 COMPREHEN METABOLIC PANEL: CPT | Performed by: SURGERY

## 2017-02-27 PROCEDURE — 85027 COMPLETE CBC AUTOMATED: CPT | Performed by: SURGERY

## 2017-02-27 PROCEDURE — 84132 ASSAY OF SERUM POTASSIUM: CPT

## 2017-02-27 PROCEDURE — 71010 XR CHEST AP PORTABLE  (CPT=71010): CPT

## 2017-02-27 PROCEDURE — 87081 CULTURE SCREEN ONLY: CPT | Performed by: SURGERY

## 2017-02-27 PROCEDURE — 93010 ELECTROCARDIOGRAM REPORT: CPT | Performed by: INTERNAL MEDICINE

## 2017-02-27 PROCEDURE — 85347 COAGULATION TIME ACTIVATED: CPT

## 2017-02-27 PROCEDURE — 85610 PROTHROMBIN TIME: CPT | Performed by: SURGERY

## 2017-02-27 PROCEDURE — 86850 RBC ANTIBODY SCREEN: CPT | Performed by: SURGERY

## 2017-02-27 PROCEDURE — 84295 ASSAY OF SERUM SODIUM: CPT

## 2017-02-27 PROCEDURE — 86920 COMPATIBILITY TEST SPIN: CPT

## 2017-02-27 PROCEDURE — 86900 BLOOD TYPING SEROLOGIC ABO: CPT | Performed by: SURGERY

## 2017-02-27 PROCEDURE — 85014 HEMATOCRIT: CPT

## 2017-02-27 PROCEDURE — 86901 BLOOD TYPING SEROLOGIC RH(D): CPT | Performed by: SURGERY

## 2017-02-27 PROCEDURE — 93005 ELECTROCARDIOGRAM TRACING: CPT

## 2017-02-27 PROCEDURE — 04V23DZ RESTRICTION OF GASTRIC ARTERY WITH INTRALUMINAL DEVICE, PERCUTANEOUS APPROACH: ICD-10-PCS | Performed by: SURGERY

## 2017-02-27 PROCEDURE — B44FZZ3 ULTRASONOGRAPHY OF RIGHT LOWER EXTREMITY ARTERIES, INTRAVASCULAR: ICD-10-PCS | Performed by: SURGERY

## 2017-02-27 PROCEDURE — 04VH3DZ RESTRICTION OF RIGHT EXTERNAL ILIAC ARTERY WITH INTRALUMINAL DEVICE, PERCUTANEOUS APPROACH: ICD-10-PCS | Performed by: SURGERY

## 2017-02-27 PROCEDURE — 82803 BLOOD GASES ANY COMBINATION: CPT

## 2017-02-27 PROCEDURE — 85025 COMPLETE CBC W/AUTO DIFF WBC: CPT | Performed by: SURGERY

## 2017-02-27 RX ORDER — KETOROLAC TROMETHAMINE 30 MG/ML
30 INJECTION, SOLUTION INTRAMUSCULAR; INTRAVENOUS EVERY 6 HOURS
Status: DISCONTINUED | OUTPATIENT
Start: 2017-02-27 | End: 2017-03-01

## 2017-02-27 RX ORDER — SODIUM CHLORIDE 9 MG/ML
INJECTION, SOLUTION INTRAVENOUS ONCE
Status: DISCONTINUED | OUTPATIENT
Start: 2017-02-27 | End: 2017-02-27

## 2017-02-27 RX ORDER — ASPIRIN 81 MG/1
81 TABLET ORAL DAILY
Status: DISCONTINUED | OUTPATIENT
Start: 2017-02-27 | End: 2017-03-01

## 2017-02-27 RX ORDER — METOCLOPRAMIDE HYDROCHLORIDE 5 MG/ML
10 INJECTION INTRAMUSCULAR; INTRAVENOUS AS NEEDED
Status: ACTIVE | OUTPATIENT
Start: 2017-02-27 | End: 2017-02-27

## 2017-02-27 RX ORDER — ONDANSETRON 2 MG/ML
4 INJECTION INTRAMUSCULAR; INTRAVENOUS EVERY 6 HOURS PRN
Status: DISCONTINUED | OUTPATIENT
Start: 2017-02-27 | End: 2017-03-01

## 2017-02-27 RX ORDER — HYDROCODONE BITARTRATE AND ACETAMINOPHEN 5; 325 MG/1; MG/1
2 TABLET ORAL AS NEEDED
Status: DISCONTINUED | OUTPATIENT
Start: 2017-02-27 | End: 2017-03-01

## 2017-02-27 RX ORDER — NALOXONE HYDROCHLORIDE 0.4 MG/ML
80 INJECTION, SOLUTION INTRAMUSCULAR; INTRAVENOUS; SUBCUTANEOUS AS NEEDED
Status: DISCONTINUED | OUTPATIENT
Start: 2017-02-27 | End: 2017-02-27

## 2017-02-27 RX ORDER — ACETAMINOPHEN 500 MG
1000 TABLET ORAL ONCE AS NEEDED
Status: DISPENSED | OUTPATIENT
Start: 2017-02-27 | End: 2017-02-27

## 2017-02-27 RX ORDER — BUPIVACAINE HYDROCHLORIDE 5 MG/ML
INJECTION, SOLUTION EPIDURAL; INTRACAUDAL AS NEEDED
Status: DISCONTINUED | OUTPATIENT
Start: 2017-02-27 | End: 2017-02-27 | Stop reason: HOSPADM

## 2017-02-27 RX ORDER — HYDROMORPHONE HYDROCHLORIDE 1 MG/ML
0.5 INJECTION, SOLUTION INTRAMUSCULAR; INTRAVENOUS; SUBCUTANEOUS EVERY 5 MIN PRN
Status: DISPENSED | OUTPATIENT
Start: 2017-02-27 | End: 2017-02-27

## 2017-02-27 RX ORDER — MIDAZOLAM HYDROCHLORIDE 1 MG/ML
1 INJECTION INTRAMUSCULAR; INTRAVENOUS EVERY 5 MIN PRN
Status: ACTIVE | OUTPATIENT
Start: 2017-02-27 | End: 2017-02-27

## 2017-02-27 RX ORDER — SODIUM CHLORIDE, SODIUM LACTATE, POTASSIUM CHLORIDE, CALCIUM CHLORIDE 600; 310; 30; 20 MG/100ML; MG/100ML; MG/100ML; MG/100ML
INJECTION, SOLUTION INTRAVENOUS CONTINUOUS
Status: DISCONTINUED | OUTPATIENT
Start: 2017-02-27 | End: 2017-03-01

## 2017-02-27 RX ORDER — HYDROCODONE BITARTRATE AND ACETAMINOPHEN 5; 325 MG/1; MG/1
1 TABLET ORAL AS NEEDED
Status: DISCONTINUED | OUTPATIENT
Start: 2017-02-27 | End: 2017-03-01

## 2017-02-27 RX ORDER — NALOXONE HYDROCHLORIDE 0.4 MG/ML
80 INJECTION, SOLUTION INTRAMUSCULAR; INTRAVENOUS; SUBCUTANEOUS AS NEEDED
Status: ACTIVE | OUTPATIENT
Start: 2017-02-27 | End: 2017-02-27

## 2017-02-27 RX ORDER — DOCUSATE SODIUM 100 MG/1
100 CAPSULE, LIQUID FILLED ORAL 2 TIMES DAILY
Status: DISCONTINUED | OUTPATIENT
Start: 2017-02-27 | End: 2017-03-01

## 2017-02-27 RX ORDER — ONDANSETRON 2 MG/ML
4 INJECTION INTRAMUSCULAR; INTRAVENOUS AS NEEDED
Status: ACTIVE | OUTPATIENT
Start: 2017-02-27 | End: 2017-02-27

## 2017-02-27 RX ORDER — ONDANSETRON 2 MG/ML
4 INJECTION INTRAMUSCULAR; INTRAVENOUS EVERY 6 HOURS PRN
Status: DISCONTINUED | OUTPATIENT
Start: 2017-02-27 | End: 2017-02-27

## 2017-02-27 RX ORDER — HYDROMORPHONE HYDROCHLORIDE 1 MG/ML
1 INJECTION, SOLUTION INTRAMUSCULAR; INTRAVENOUS; SUBCUTANEOUS EVERY 2 HOUR PRN
Status: DISCONTINUED | OUTPATIENT
Start: 2017-02-27 | End: 2017-03-01

## 2017-02-27 RX ORDER — HYDROMORPHONE HYDROCHLORIDE 1 MG/ML
0.2 INJECTION, SOLUTION INTRAMUSCULAR; INTRAVENOUS; SUBCUTANEOUS EVERY 5 MIN PRN
Status: ACTIVE | OUTPATIENT
Start: 2017-02-27 | End: 2017-02-27

## 2017-02-27 RX ORDER — DEXAMETHASONE SODIUM PHOSPHATE 4 MG/ML
4 VIAL (ML) INJECTION AS NEEDED
Status: ACTIVE | OUTPATIENT
Start: 2017-02-27 | End: 2017-02-27

## 2017-02-27 RX ORDER — HYDROMORPHONE HYDROCHLORIDE 1 MG/ML
0.5 INJECTION, SOLUTION INTRAMUSCULAR; INTRAVENOUS; SUBCUTANEOUS EVERY 2 HOUR PRN
Status: DISCONTINUED | OUTPATIENT
Start: 2017-02-27 | End: 2017-03-01

## 2017-02-27 RX ORDER — ONDANSETRON 2 MG/ML
4 INJECTION INTRAMUSCULAR; INTRAVENOUS AS NEEDED
Status: DISCONTINUED | OUTPATIENT
Start: 2017-02-27 | End: 2017-02-27

## 2017-02-27 RX ORDER — MEPERIDINE HYDROCHLORIDE 25 MG/ML
12.5 INJECTION INTRAMUSCULAR; INTRAVENOUS; SUBCUTANEOUS AS NEEDED
Status: DISCONTINUED | OUTPATIENT
Start: 2017-02-27 | End: 2017-03-01

## 2017-02-27 RX ORDER — ENOXAPARIN SODIUM 100 MG/ML
0.5 INJECTION SUBCUTANEOUS DAILY
Status: DISCONTINUED | OUTPATIENT
Start: 2017-02-28 | End: 2017-03-01

## 2017-02-27 RX ORDER — SODIUM CHLORIDE 9 MG/ML
INJECTION, SOLUTION INTRAVENOUS CONTINUOUS
Status: DISCONTINUED | OUTPATIENT
Start: 2017-02-27 | End: 2017-02-28

## 2017-02-27 RX ORDER — ONDANSETRON 4 MG/1
4 TABLET, ORALLY DISINTEGRATING ORAL EVERY 6 HOURS PRN
Status: DISCONTINUED | OUTPATIENT
Start: 2017-02-27 | End: 2017-03-01

## 2017-02-27 RX ORDER — SODIUM CHLORIDE 9 MG/ML
INJECTION, SOLUTION INTRAVENOUS CONTINUOUS
Status: DISCONTINUED | OUTPATIENT
Start: 2017-02-27 | End: 2017-02-27

## 2017-02-27 RX ORDER — CEFAZOLIN SODIUM 1 G/3ML
INJECTION, POWDER, FOR SOLUTION INTRAMUSCULAR; INTRAVENOUS
Status: DISCONTINUED | OUTPATIENT
Start: 2017-02-27 | End: 2017-02-27 | Stop reason: HOSPADM

## 2017-02-27 NOTE — PROGRESS NOTES
[facility] Pharmacy Progress Note:  Anticoagulation Weight Dose Adjustment for enoxaparin (LOVENOX)    Sherryle Atta is a 46year old male who has been prescribed enoxaparin (LOVENOX) for VTE prophylaxis.       Estimated Creatinine Clearance: 96.4 mL/min (b

## 2017-02-27 NOTE — BRIEF OP NOTE
[address]  Brief Op Note     [address] Location: OR   CSN [id number] MRN [id number]   Admission Date 2/27/2017 Operation Date 2/27/2017   Attending Physician Annalee Abreu MD Operating Physician Paerl Davis MD       Pre-Operativ

## 2017-02-27 NOTE — H&P
[facility]  Vascular Surgery    Arteaga Blitz Patient Status:  Inpatient    5/3/1965 MRN [id number]   Location [address] [facility] Attending Aroldo Ruiz MD   Hosp Day # 0 PCP Otilia Lee MD       History of Pr pain, joint pain, leg swelling  NEURO/EYES: denies headaches, passing out, motor dysfunction, difficulty walking, difficulty with speech, temporary blindness, double vision, confusion    Physical Exam:  /77 mmHg  Pulse 74  Temp(Src) 97.9 °F (36.6 °C) Discussed the risk of buttock claudication from coil embolization. Thank you for allowing me to participate in the care of your patient.     Pete Buckner MD  2/27/2017  7:27 AM

## 2017-02-28 LAB
ALBUMIN SERPL-MCNC: 2.7 G/DL (ref 3.5–4.8)
ALP LIVER SERPL-CCNC: 84 U/L (ref 45–117)
ALT SERPL-CCNC: 46 U/L (ref 17–63)
AST SERPL-CCNC: 167 U/L (ref 15–41)
BASOPHILS # BLD AUTO: 0.04 X10(3) UL (ref 0–0.1)
BASOPHILS NFR BLD AUTO: 0.4 %
BILIRUB SERPL-MCNC: 0.5 MG/DL (ref 0.1–2)
BUN BLD-MCNC: 19 MG/DL (ref 8–20)
CALCIUM BLD-MCNC: 8.4 MG/DL (ref 8.3–10.3)
CHLORIDE: 104 MMOL/L (ref 101–111)
CO2: 28 MMOL/L (ref 22–32)
CREAT BLD-MCNC: 0.71 MG/DL (ref 0.7–1.3)
EOSINOPHIL # BLD AUTO: 0.06 X10(3) UL (ref 0–0.3)
EOSINOPHIL NFR BLD AUTO: 0.6 %
ERYTHROCYTE [DISTWIDTH] IN BLOOD BY AUTOMATED COUNT: 13.2 % (ref 11.5–16)
GLUCOSE BLD-MCNC: 110 MG/DL (ref 70–99)
GLUCOSE BLD-MCNC: 124 MG/DL (ref 65–99)
HCT VFR BLD AUTO: 37.5 % (ref 37–53)
HGB BLD-MCNC: 12.3 G/DL (ref 13–17)
IMMATURE GRANULOCYTE COUNT: 0.04 X10(3) UL (ref 0–1)
IMMATURE GRANULOCYTE RATIO %: 0.4 %
ISTAT ACTIVATED CLOTTING TIME: 157 SECONDS (ref 74–137)
ISTAT BLOOD GAS BASE EXCESS: 1 MMOL/L
ISTAT BLOOD GAS HCO3: 27.4 MEQ/L (ref 22–26)
ISTAT BLOOD GAS O2 SATURATION: 98 % (ref 92–100)
ISTAT BLOOD GAS PCO2: 56 MMHG (ref 35–45)
ISTAT BLOOD GAS PH: 7.3 (ref 7.35–7.45)
ISTAT BLOOD GAS PO2: 124 MMHG (ref 80–105)
ISTAT BLOOD GAS TCO2: 29 MMOL/L (ref 22–32)
ISTAT HEMATOCRIT: 42 % (ref 37–54)
ISTAT IONIZED CALCIUM: 1.2 MMOL/L (ref 1.12–1.32)
ISTAT POTASSIUM: 5.1 MMOL/L (ref 3.6–5.1)
ISTAT SODIUM: 136 MMOL/L (ref 136–144)
LYMPHOCYTES # BLD AUTO: 1.12 X10(3) UL (ref 0.9–4)
LYMPHOCYTES NFR BLD AUTO: 10.6 %
M PROTEIN MFR SERPL ELPH: 6.4 G/DL (ref 6.1–8.3)
MCH RBC QN AUTO: 30.8 PG (ref 27–33.2)
MCHC RBC AUTO-ENTMCNC: 32.8 G/DL (ref 31–37)
MCV RBC AUTO: 93.8 FL (ref 80–99)
MONOCYTES # BLD AUTO: 0.9 X10(3) UL (ref 0.1–0.6)
MONOCYTES NFR BLD AUTO: 8.5 %
NEUTROPHIL ABS PRELIM: 8.41 X10 (3) UL (ref 1.3–6.7)
NEUTROPHILS # BLD AUTO: 8.41 X10(3) UL (ref 1.3–6.7)
NEUTROPHILS NFR BLD AUTO: 79.5 %
PLATELET # BLD AUTO: 189 10(3)UL (ref 150–450)
POTASSIUM SERPL-SCNC: 3.9 MMOL/L (ref 3.6–5.1)
RBC # BLD AUTO: 4 X10(6)UL (ref 4.3–5.7)
RED CELL DISTRIBUTION WIDTH-SD: 44.9 FL (ref 35.1–46.3)
SODIUM SERPL-SCNC: 139 MMOL/L (ref 136–144)
WBC # BLD AUTO: 10.6 X10(3) UL (ref 4–13)

## 2017-02-28 PROCEDURE — 85025 COMPLETE CBC W/AUTO DIFF WBC: CPT | Performed by: SURGERY

## 2017-02-28 PROCEDURE — 80053 COMPREHEN METABOLIC PANEL: CPT | Performed by: SURGERY

## 2017-02-28 NOTE — PROGRESS NOTES
Doing ok  Mild right buttock pain consistent with hypogastric embolization  Incision clean and intact  Palpable DP pulse    Will transfer to floor  Ambulate in mohan

## 2017-02-28 NOTE — PLAN OF CARE
Assumed pt care at 0730. Pt alert and oriented x4. NSR on Tele. Pt with hx of ABIODUN, O2 at night only. Right groin remains intact. Pulses +2. Encourage IS/Ambulation. Will continue to monitor. Transfer to Tele when bed available.

## 2017-02-28 NOTE — PLAN OF CARE
Transferred from CCU around 0930  A&Ox4, VSS on RA, NSR on tele  Low grade temp. Encouraged IS  R groin incision intact  C/o pain to R buttocks during ambulation. Pain controlled with toradol  Ambulated in the halls.  Tolerated well    CARDIOVASCULAR - ADUL

## 2017-02-28 NOTE — PLAN OF CARE
Pt received at 1900 alert/oriented x4, HOLLIS, following all commands. Pt denies any SOB. Pt c/o right buttock thobbing, pressure. Dr. Christopher Brown called and notified of pain, pt vomiting after dinner and unable to keep PO fluids down.  Orders received for IV pain

## 2017-02-28 NOTE — PLAN OF CARE
Received pt from [facility]. Right groin with Dermabond, TRACIE. C/D/I. Pt up to the chair after 4 hours. A-line discontinued. VSS. Will continue to monitor.

## 2017-03-01 VITALS
SYSTOLIC BLOOD PRESSURE: 126 MMHG | WEIGHT: 315 LBS | HEART RATE: 80 BPM | OXYGEN SATURATION: 95 % | DIASTOLIC BLOOD PRESSURE: 68 MMHG | BODY MASS INDEX: 50.62 KG/M2 | RESPIRATION RATE: 23 BRPM | HEIGHT: 66 IN | TEMPERATURE: 99 F

## 2017-03-01 PROCEDURE — 97161 PT EVAL LOW COMPLEX 20 MIN: CPT

## 2017-03-01 RX ORDER — HYDROCODONE BITARTRATE AND ACETAMINOPHEN 10; 325 MG/1; MG/1
1 TABLET ORAL EVERY 6 HOURS PRN
Qty: 60 TABLET | Refills: 0 | Status: SHIPPED | OUTPATIENT
Start: 2017-03-01 | End: 2017-03-08

## 2017-03-01 NOTE — PLAN OF CARE
NURSING DISCHARGE NOTE    Discharged Home via Wheelchair. Accompanied by Family member and Support staff  Belongings Taken by patient/family. Discharge instructions and packet given to patient, all questions and concerns answered.

## 2017-03-01 NOTE — PROGRESS NOTES
Doing well  Reports buttock pain resolved  Palpable DP bilateral    Discussed buttock claudication symptoms will come and go   Discussed importance of exercise program for any claudication  Will discharge  Change to full strength aspirin

## 2017-03-01 NOTE — PLAN OF CARE
Pt A/Ox4, on RA, NSR per tele, Pt up and ambulating in room  C/o of Rt buttocks pain at times, Toradol given with relief  Rt groin with dermabond, c/d/i   Plan: Discharge to home  Will cont to monitor    CARDIOVASCULAR - ADULT    • Maintains optimal cardia

## 2017-03-01 NOTE — PHYSICAL THERAPY NOTE
PHYSICAL THERAPY QUICK EVALUATION - INPATIENT    Room Number: 7433/4054-M  Evaluation Date: 3/1/2017  Presenting Problem: endoleak post endovascular aneurysm repair 2/27/17  Physician Order: PT Eval and Treat    Problem List  Active Problems:    Endoleak p another person does the patient currently need. ..   -   Moving to and from a bed to a chair (including a wheelchair)?: None   -   Need to walk in hospital room?: None   -   Climbing 3-5 steps with a railing?: None       AM-PAC Score:  Raw Score: 24   PT Ap and presents with no skilled Physical Therapy needs at this time. Patient discharged from Physical Therapy services. Please re-order if a new functional limitation presents during this admission.     GOALS  Patient was able to achieve the following goals

## 2017-03-03 LAB — BLOOD TYPE BARCODE: 6200

## 2017-03-23 NOTE — OPERATIVE REPORT
[address]   Operative Report     Zeina Lynch Location: OR   [facility] [id number] N [id number]   Admission Date 2/27/2017 Operation Date 2/27/2017   Attending Physician Andrew Cox MD Operating Physician Layla Galarza MD       Pre-Operative We then used intravascular ultrasound to evaluate if stapling with provide adequate seal or if  Placing a modified cuff and stapling would be an option. We used IVUS the external iliac and common iliac artery.   Measurements on intravascular ultrasound sh

## 2017-03-24 NOTE — DISCHARGE SUMMARY
Vascular Surgery  Surgical Discharge Summary    Admission Date: 2/27/2017   D/C Date: 3/1/2017  Discharge Diagnosis: type 1 endo leak  Discharge Condition: good      HISTORY OF PRESENT ILLNESS: Antonella Nunez is a 46year old  male who was ad Temp(Src) 99.1 °F (37.3 °C) (Oral)  Resp 23  Ht 5' 6\" (1.676 m)  Wt 322 lb 1.6 oz (146.104 kg)  BMI 52.01 kg/m2  SpO2 95%      palpable DORSALIS PEDIS pulses    DISCHARGE INSTRUCTIONS: Discharge instructions were reviewed with the patient including follow

## 2018-09-14 PROBLEM — E78.5 DYSLIPIDEMIA: Chronic | Status: ACTIVE | Noted: 2018-09-14

## 2018-09-14 PROBLEM — R06.83 SNORING: Status: ACTIVE | Noted: 2018-09-14

## 2018-09-14 PROBLEM — I71.4 ABDOMINAL AORTIC ANEURYSM (AAA) WITHOUT RUPTURE (HCC): Chronic | Status: ACTIVE | Noted: 2017-01-09

## 2018-09-14 PROBLEM — R73.03 PRE-DIABETES: Status: ACTIVE | Noted: 2018-09-14

## 2018-09-14 PROBLEM — M54.9 INTRACTABLE BACK PAIN: Chronic | Status: ACTIVE | Noted: 2017-01-09

## 2018-09-14 PROBLEM — R73.03 PRE-DIABETES: Chronic | Status: ACTIVE | Noted: 2018-09-14

## 2018-09-14 PROBLEM — E66.01 MORBID OBESITY (HCC): Chronic | Status: ACTIVE | Noted: 2018-09-14

## 2018-09-14 PROBLEM — E66.01 MORBID OBESITY (HCC): Status: ACTIVE | Noted: 2018-09-14

## 2018-09-14 PROBLEM — I71.40 ABDOMINAL AORTIC ANEURYSM (AAA) WITHOUT RUPTURE: Chronic | Status: ACTIVE | Noted: 2017-01-09

## 2018-09-14 PROBLEM — E78.5 DYSLIPIDEMIA: Status: ACTIVE | Noted: 2018-09-14

## 2021-07-21 PROBLEM — Z12.5 PROSTATE CANCER SCREENING: Status: ACTIVE | Noted: 2021-07-21

## 2021-07-21 PROBLEM — Z12.11 COLON CANCER SCREENING: Status: ACTIVE | Noted: 2021-07-21

## 2021-07-21 PROBLEM — Z98.890 STATUS POST ABDOMINAL AORTIC ANEURYSM (AAA) REPAIR: Status: ACTIVE | Noted: 2017-02-27

## 2021-07-21 PROBLEM — M54.9 INTRACTABLE BACK PAIN: Chronic | Status: RESOLVED | Noted: 2017-01-09 | Resolved: 2021-07-21

## 2021-07-21 PROBLEM — E11.9 DIABETIC EYE EXAM (HCC): Status: ACTIVE | Noted: 2021-07-21

## 2021-07-21 PROBLEM — Z12.71 SCREENING FOR TESTICULAR CANCER: Status: ACTIVE | Noted: 2021-07-21

## 2021-07-21 PROBLEM — Z00.00 HEALTH CARE MAINTENANCE: Status: ACTIVE | Noted: 2021-07-21

## 2021-07-21 PROBLEM — R03.0 ELEVATED BLOOD-PRESSURE READING, WITHOUT DIAGNOSIS OF HYPERTENSION: Status: ACTIVE | Noted: 2021-07-21

## 2021-07-21 PROBLEM — Z86.79 STATUS POST ABDOMINAL AORTIC ANEURYSM (AAA) REPAIR: Status: ACTIVE | Noted: 2017-02-27

## 2021-07-21 PROBLEM — E11.9 ENCOUNTER FOR DIABETIC FOOT EXAM (HCC): Status: ACTIVE | Noted: 2021-07-21

## 2021-07-21 PROBLEM — Z01.00 DIABETIC EYE EXAM (HCC): Status: ACTIVE | Noted: 2021-07-21

## 2021-07-21 PROBLEM — R73.03 PRE-DIABETES: Chronic | Status: RESOLVED | Noted: 2018-09-14 | Resolved: 2021-07-21

## 2021-07-28 PROBLEM — N40.0 BENIGN PROSTATIC HYPERPLASIA WITHOUT URINARY OBSTRUCTION: Status: ACTIVE | Noted: 2019-11-05

## 2021-07-28 PROBLEM — N48.89 PENILE LUMP: Status: ACTIVE | Noted: 2021-07-28

## 2021-07-28 PROBLEM — M51.36 DEGENERATION OF LUMBAR INTERVERTEBRAL DISC: Status: ACTIVE | Noted: 2019-11-05

## 2021-07-28 PROBLEM — I10 HYPERTENSIVE DISORDER: Status: ACTIVE | Noted: 2020-06-17

## 2021-07-28 PROBLEM — E11.9 TYPE II DIABETES MELLITUS, WELL CONTROLLED (HCC): Status: ACTIVE | Noted: 2020-01-27

## 2021-07-28 PROBLEM — I87.2 PERIPHERAL VENOUS INSUFFICIENCY: Status: ACTIVE | Noted: 2019-08-07

## 2021-07-28 PROBLEM — E55.9 VITAMIN D DEFICIENCY: Status: ACTIVE | Noted: 2020-06-26

## 2021-08-17 PROBLEM — E11.9 TYPE 2 DIABETES MELLITUS TREATED WITHOUT INSULIN (HCC): Status: ACTIVE | Noted: 2020-01-27

## 2021-08-17 PROBLEM — R31.9 URINARY TRACT INFECTION WITH HEMATURIA, SITE UNSPECIFIED: Status: ACTIVE | Noted: 2021-08-17

## 2021-08-17 PROBLEM — N39.0 URINARY TRACT INFECTION WITH HEMATURIA, SITE UNSPECIFIED: Status: ACTIVE | Noted: 2021-08-17

## 2021-08-17 PROBLEM — R19.09 MASS OF RIGHT INGUINAL REGION: Status: ACTIVE | Noted: 2021-08-17

## 2022-03-09 PROBLEM — I10 HYPERTENSIVE DISORDER: Status: RESOLVED | Noted: 2020-06-17 | Resolved: 2022-03-09

## 2022-03-09 PROBLEM — N52.9 MALE ERECTILE DISORDER: Status: ACTIVE | Noted: 2022-03-09

## 2022-03-09 PROBLEM — Z00.00 MEDICARE ANNUAL WELLNESS VISIT, INITIAL: Status: ACTIVE | Noted: 2022-03-09

## 2022-03-09 PROBLEM — R31.9 HEMATURIA, UNSPECIFIED TYPE: Status: ACTIVE | Noted: 2022-03-09

## 2022-05-24 ENCOUNTER — HOSPITAL ENCOUNTER (OUTPATIENT)
Facility: HOSPITAL | Age: 57
Setting detail: HOSPITAL OUTPATIENT SURGERY
Discharge: HOME | End: 2022-05-24
Attending: SURGERY | Admitting: SURGERY
Payer: MEDICARE

## 2022-05-24 ENCOUNTER — ANESTHESIA EVENT (OUTPATIENT)
Dept: ENDOSCOPY | Facility: HOSPITAL | Age: 57
End: 2022-05-24
Payer: MEDICARE

## 2022-05-24 ENCOUNTER — ANESTHESIA (OUTPATIENT)
Dept: ENDOSCOPY | Facility: HOSPITAL | Age: 57
End: 2022-05-24
Payer: MEDICARE

## 2022-05-24 VITALS
SYSTOLIC BLOOD PRESSURE: 118 MMHG | WEIGHT: 315 LBS | TEMPERATURE: 98 F | HEART RATE: 65 BPM | RESPIRATION RATE: 16 BRPM | BODY MASS INDEX: 50.62 KG/M2 | OXYGEN SATURATION: 94 % | DIASTOLIC BLOOD PRESSURE: 74 MMHG | HEIGHT: 66 IN

## 2022-05-24 LAB — GLUCOSE BLD-MCNC: 98 MG/DL (ref 70–99)

## 2022-05-24 PROCEDURE — 82962 GLUCOSE BLOOD TEST: CPT

## 2022-05-24 PROCEDURE — 0DJD8ZZ INSPECTION OF LOWER INTESTINAL TRACT, VIA NATURAL OR ARTIFICIAL OPENING ENDOSCOPIC: ICD-10-PCS | Performed by: SURGERY

## 2022-05-24 RX ORDER — DEXTROSE MONOHYDRATE 25 G/50ML
50 INJECTION, SOLUTION INTRAVENOUS
Status: DISCONTINUED | OUTPATIENT
Start: 2022-05-24 | End: 2022-05-24 | Stop reason: HOSPADM

## 2022-05-24 RX ORDER — SODIUM CHLORIDE, SODIUM LACTATE, POTASSIUM CHLORIDE, CALCIUM CHLORIDE 600; 310; 30; 20 MG/100ML; MG/100ML; MG/100ML; MG/100ML
INJECTION, SOLUTION INTRAVENOUS CONTINUOUS
Status: DISCONTINUED | OUTPATIENT
Start: 2022-05-24 | End: 2022-05-24

## 2022-05-24 RX ORDER — NICOTINE POLACRILEX 4 MG
15 LOZENGE BUCCAL
Status: DISCONTINUED | OUTPATIENT
Start: 2022-05-24 | End: 2022-05-24 | Stop reason: HOSPADM

## 2022-05-24 RX ORDER — LIDOCAINE HYDROCHLORIDE 10 MG/ML
INJECTION, SOLUTION EPIDURAL; INFILTRATION; INTRACAUDAL; PERINEURAL AS NEEDED
Status: DISCONTINUED | OUTPATIENT
Start: 2022-05-24 | End: 2022-05-24 | Stop reason: SURG

## 2022-05-24 RX ORDER — NICOTINE POLACRILEX 4 MG
30 LOZENGE BUCCAL
Status: DISCONTINUED | OUTPATIENT
Start: 2022-05-24 | End: 2022-05-24 | Stop reason: HOSPADM

## 2022-05-24 RX ORDER — ONDANSETRON 2 MG/ML
4 INJECTION INTRAMUSCULAR; INTRAVENOUS AS NEEDED
Status: DISCONTINUED | OUTPATIENT
Start: 2022-05-24 | End: 2022-05-24

## 2022-05-24 RX ORDER — NALOXONE HYDROCHLORIDE 0.4 MG/ML
80 INJECTION, SOLUTION INTRAMUSCULAR; INTRAVENOUS; SUBCUTANEOUS AS NEEDED
Status: DISCONTINUED | OUTPATIENT
Start: 2022-05-24 | End: 2022-05-24

## 2022-05-24 RX ADMIN — LIDOCAINE HYDROCHLORIDE 25 MG: 10 INJECTION, SOLUTION EPIDURAL; INFILTRATION; INTRACAUDAL; PERINEURAL at 13:40:00

## 2022-05-24 RX ADMIN — SODIUM CHLORIDE, SODIUM LACTATE, POTASSIUM CHLORIDE, CALCIUM CHLORIDE: 600; 310; 30; 20 INJECTION, SOLUTION INTRAVENOUS at 14:02:00

## 2022-05-24 NOTE — OPERATIVE REPORT
Colonoscopy Operative Report    Sawyer Pike Patient Status:  Hospital Outpatient Surgery    5/3/1965 MRN [id number]   Location [address] [place] [address] Attending Nick Mckinnon MD   Hosp Day #   0 PCP Chip Knowles MD     Pre-Operative Diagnosis:     Colon cancer screening    Post-Operative Diagnosis:     Normal colonoscopy  Mild hemorrhoids    Procedure Performed:     Colonoscopy    Informed Consent: Informed consent for both the procedure and sedation were obtained from the patient. The potentially life-threatening complications of sedation, bleeding,  perforation, transfusion or repeat endoscopy  were reviewed along with the possible need for hospitalization, surgical management, transfusion or repeat endoscopy should one of these complications arise. The patient understands and is agreeable to proceed. Sedation Type: MAC-Patient received sedation with monitored anesthesia provided by an anesthesiologist      Procedure Description: The patient was placed in the left lateral decubitus position. After careful digital rectal examination, the  colonoscope was inserted into the rectum and advanced to the level of the cecum under direct visualization. The cecum was identified by landmarks, including the appendiceal orifice and ileoceccal valve. Careful examination of the entire colon was performed during withdrawal of the endoscope. The scope was withdrawn to the rectum and retroflexion was performed. The patient tolerated the procedure well with no immediate complications. The patient was transferred to the recovery area in stable condition.   Quality of Preparation: Adequate  Findings: Normal colon    Impression: Normal colonoscopy except for some mild hemorrhoids    Plan: Follow-up colonoscopy in 10 years unless symptoms otherwise dictate  Yohan Em Ambrose MD  5/24/2022  1:55 PM

## 2022-05-24 NOTE — ANESTHESIA POSTPROCEDURE EVALUATION
[facility] Patient Status:  Hospital Outpatient Surgery   Age/Gender 59year old male MRN [id number]   Location [address] [address] Attending Tammi Fish MD   [address] Day # 0 PCP Carlito Quiñonez MD       Anesthesia Post-op Note    COLONOSCOPY POSSIBLE BIOPSY, POSSIBLE POLYPECTOMY    Procedure Summary     Date: 05/24/22 Room / Location: [facility] ENDOSCOPY 04 / [facility] ENDOSCOPY    Anesthesia Start: 5564 Anesthesia Stop: 1512    Procedure: COLONOSCOPY POSSIBLE BIOPSY, POSSIBLE POLYPECTOMY (N/A ) Diagnosis: (Normal Colon)    Surgeons: Tammi Fish MD Anesthesiologist: Lorie Adkins MD    Anesthesia Type: general ASA Status: 3          Anesthesia Type: general    Vitals Value Taken Time   /75 05/24/22 1402   Temp  05/24/22 1403   Pulse 64 05/24/22 1402   Resp 18 05/24/22 1402   SpO2 97 % 05/24/22 1402       Patient Location: Endoscopy    Anesthesia Type: MAC    Airway Patency: patent    Postop Pain Control: adequate    Mental Status: mildly sedated but able to meaningfully participate in the post-anesthesia evaluation    Nausea/Vomiting: none    Cardiopulmonary/Hydration status: stable euvolemic    Complications: no apparent anesthesia related complications    Postop vital signs: stable    Dental Exam: Unchanged from Preop    Patient to be discharged home when criteria met.

## 2022-11-05 ENCOUNTER — HOSPITAL ENCOUNTER (EMERGENCY)
Age: 57
Discharge: HOME | End: 2022-11-05
Attending: EMERGENCY MEDICINE
Payer: MEDICARE

## 2022-11-05 ENCOUNTER — APPOINTMENT (OUTPATIENT)
Dept: CT IMAGING | Age: 57
End: 2022-11-05
Attending: NURSE PRACTITIONER
Payer: MEDICARE

## 2022-11-05 VITALS
BODY MASS INDEX: 50.62 KG/M2 | OXYGEN SATURATION: 94 % | DIASTOLIC BLOOD PRESSURE: 101 MMHG | WEIGHT: 315 LBS | RESPIRATION RATE: 18 BRPM | HEIGHT: 66 IN | SYSTOLIC BLOOD PRESSURE: 165 MMHG | HEART RATE: 66 BPM | TEMPERATURE: 97 F

## 2022-11-05 DIAGNOSIS — G50.0 TRIGEMINAL NEURALGIA: Primary | ICD-10-CM

## 2022-11-05 LAB
ALBUMIN SERPL-MCNC: 3.7 G/DL (ref 3.4–5)
ALBUMIN/GLOB SERPL: 0.9 {RATIO} (ref 1–2)
ALP LIVER SERPL-CCNC: 68 U/L
ALT SERPL-CCNC: 35 U/L
ANION GAP SERPL CALC-SCNC: 5 MMOL/L (ref 0–18)
AST SERPL-CCNC: 20 U/L (ref 15–37)
BASOPHILS # BLD AUTO: 0.06 X10(3) UL (ref 0–0.2)
BASOPHILS NFR BLD AUTO: 0.8 %
BILIRUB SERPL-MCNC: 0.5 MG/DL (ref 0.1–2)
BUN BLD-MCNC: 22 MG/DL (ref 7–18)
CALCIUM BLD-MCNC: 8.9 MG/DL (ref 8.5–10.1)
CHLORIDE SERPL-SCNC: 106 MMOL/L (ref 98–112)
CO2 SERPL-SCNC: 26 MMOL/L (ref 21–32)
CREAT BLD-MCNC: 0.9 MG/DL
EOSINOPHIL # BLD AUTO: 0.15 X10(3) UL (ref 0–0.7)
EOSINOPHIL NFR BLD AUTO: 2 %
ERYTHROCYTE [DISTWIDTH] IN BLOOD BY AUTOMATED COUNT: 13.6 %
GFR SERPLBLD BASED ON 1.73 SQ M-ARVRAT: 98 ML/MIN/1.73M2 (ref 60–?)
GLOBULIN PLAS-MCNC: 3.9 G/DL (ref 2.8–4.4)
GLUCOSE BLD-MCNC: 107 MG/DL (ref 70–99)
HCT VFR BLD AUTO: 54.8 %
HGB BLD-MCNC: 18.6 G/DL
IMM GRANULOCYTES # BLD AUTO: 0.01 X10(3) UL (ref 0–1)
IMM GRANULOCYTES NFR BLD: 0.1 %
LYMPHOCYTES # BLD AUTO: 2.14 X10(3) UL (ref 1–4)
LYMPHOCYTES NFR BLD AUTO: 29 %
MCH RBC QN AUTO: 31.9 PG (ref 26–34)
MCHC RBC AUTO-ENTMCNC: 33.9 G/DL (ref 31–37)
MCV RBC AUTO: 94 FL
MONOCYTES # BLD AUTO: 0.58 X10(3) UL (ref 0.1–1)
MONOCYTES NFR BLD AUTO: 7.9 %
NEUTROPHILS # BLD AUTO: 4.44 X10 (3) UL (ref 1.5–7.7)
NEUTROPHILS # BLD AUTO: 4.44 X10(3) UL (ref 1.5–7.7)
NEUTROPHILS NFR BLD AUTO: 60.2 %
OSMOLALITY SERPL CALC.SUM OF ELEC: 288 MOSM/KG (ref 275–295)
PLATELET # BLD AUTO: 193 10(3)UL (ref 150–450)
POTASSIUM SERPL-SCNC: 4.1 MMOL/L (ref 3.5–5.1)
PROT SERPL-MCNC: 7.6 G/DL (ref 6.4–8.2)
RBC # BLD AUTO: 5.83 X10(6)UL
SODIUM SERPL-SCNC: 137 MMOL/L (ref 136–145)
WBC # BLD AUTO: 7.4 X10(3) UL (ref 4–11)

## 2022-11-05 PROCEDURE — 99284 EMERGENCY DEPT VISIT MOD MDM: CPT | Performed by: EMERGENCY MEDICINE

## 2022-11-05 PROCEDURE — 80053 COMPREHEN METABOLIC PANEL: CPT | Performed by: NURSE PRACTITIONER

## 2022-11-05 PROCEDURE — 85025 COMPLETE CBC W/AUTO DIFF WBC: CPT | Performed by: NURSE PRACTITIONER

## 2022-11-05 PROCEDURE — 70450 CT HEAD/BRAIN W/O DYE: CPT | Performed by: NURSE PRACTITIONER

## 2022-11-05 PROCEDURE — 96375 TX/PRO/DX INJ NEW DRUG ADDON: CPT | Performed by: EMERGENCY MEDICINE

## 2022-11-05 PROCEDURE — 96374 THER/PROPH/DIAG INJ IV PUSH: CPT | Performed by: EMERGENCY MEDICINE

## 2022-11-05 RX ORDER — KETOROLAC TROMETHAMINE 30 MG/ML
30 INJECTION, SOLUTION INTRAMUSCULAR; INTRAVENOUS ONCE
Status: COMPLETED | OUTPATIENT
Start: 2022-11-05 | End: 2022-11-05

## 2022-11-05 RX ORDER — ONDANSETRON 2 MG/ML
4 INJECTION INTRAMUSCULAR; INTRAVENOUS ONCE
Status: COMPLETED | OUTPATIENT
Start: 2022-11-05 | End: 2022-11-05

## 2022-11-05 RX ORDER — HYDROCODONE BITARTRATE AND ACETAMINOPHEN 5; 325 MG/1; MG/1
1-2 TABLET ORAL EVERY 6 HOURS PRN
Qty: 10 TABLET | Refills: 0 | Status: SHIPPED | OUTPATIENT
Start: 2022-11-05 | End: 2022-11-10

## 2022-11-05 RX ORDER — MORPHINE SULFATE 4 MG/ML
4 INJECTION, SOLUTION INTRAMUSCULAR; INTRAVENOUS ONCE
Status: COMPLETED | OUTPATIENT
Start: 2022-11-05 | End: 2022-11-05

## 2022-11-05 RX ORDER — GABAPENTIN 300 MG/1
CAPSULE ORAL
Qty: 15 CAPSULE | Refills: 0 | Status: SHIPPED | OUTPATIENT
Start: 2022-11-05

## 2022-11-05 NOTE — ED INITIAL ASSESSMENT (HPI)
States for last three days has had headache to right temple that \"feels like electric shocks\" states he cannot eat or brush teeth without the pain.  No visual changes

## (undated) DEVICE — STERILE POLYISOPRENE POWDER-FREE SURGICAL GLOVES: Brand: PROTEXIS

## (undated) DEVICE — SUTURE SILK 3-0

## (undated) DEVICE — SUTURE SILK 2-0

## (undated) DEVICE — SUTURE VICRYL 3-0 SH

## (undated) DEVICE — SUTURE SILK 2-0 SH

## (undated) DEVICE — MEDI-VAC SUCTION FINE CAPACITY: Brand: CARDINAL HEALTH

## (undated) DEVICE — DRAPE WARMER ORS-100

## (undated) DEVICE — STANDARD HYPODERMIC NEEDLE,POLYPROPYLENE HUB: Brand: MONOJECT

## (undated) DEVICE — PROXIMATE RH ROTATING HEAD SKIN STAPLERS (35 WIDE) CONTAINS 35 STAINLESS STEEL STAPLES: Brand: PROXIMATE

## (undated) DEVICE — CHLORAPREP 26ML APPLICATOR

## (undated) DEVICE — SUTURE PROLENE 5-0 C-1

## (undated) DEVICE — SUTURE PROLENE 6-0 C-1

## (undated) DEVICE — SOL  .9 1000ML BTL

## (undated) DEVICE — FILTERLINE NASAL ADULT O2/CO2

## (undated) DEVICE — 3M™ TEGADERM™ TRANSPARENT FILM DRESSING, 1626W, 4 IN X 4-3/4 IN (10 CM X 12 CM), 50 EACH/CARTON, 4 CARTON/CASE: Brand: 3M™ TEGADERM™

## (undated) DEVICE — SUTURE VICRYL 2-0 CT-1

## (undated) DEVICE — Device

## (undated) DEVICE — HEMOCLIP MED 24 CLIP/CARTRIDGE

## (undated) DEVICE — BASIC DOUBLE BASIN 1-LF: Brand: MEDLINE INDUSTRIES, INC.

## (undated) DEVICE — SUTURE MONOCRYL 4-0 PS-2

## (undated) DEVICE — GLOVE SURG TRIUMPH SZ 8

## (undated) DEVICE — CV PACK-LF: Brand: MEDLINE INDUSTRIES, INC.

## (undated) DEVICE — 3M™ IOBAN™ 2 ANTIMICROBIAL INCISE DRAPE 6651EZ: Brand: IOBAN™ 2

## (undated) DEVICE — 1200CC GUARDIAN II: Brand: GUARDIAN

## (undated) DEVICE — TRANSPOSAL ULTRAFLEX DUO/QUAD ULTRA CART MANIFOLD

## (undated) DEVICE — SUTURE PDS II 1 TP-1

## (undated) DEVICE — ALARIS STOPCOCK 4 WAY

## (undated) DEVICE — INTENDED TO BE USED TO OCCLUDE, RETRACT AND IDENTIFY ARTERIES, VEINS, TENDONS AND NERVES IN SURGICAL PROCEDURES: Brand: STERION®  VESSEL LOOP

## (undated) DEVICE — Device: Brand: DEFENDO AIR/WATER/SUCTION AND BIOPSY VALVE

## (undated) DEVICE — GEL AQUASONIC 100 20GR

## (undated) DEVICE — 3M™ STERI-STRIP™ REINFORCED ADHESIVE SKIN CLOSURES, R1547, 1/2 IN X 4 IN (12 MM X 100 MM), 6 STRIPS/ENVELOPE: Brand: 3M™ STERI-STRIP™

## (undated) DEVICE — SUTURE MONOCRYL 3-0 PS-2

## (undated) DEVICE — 3M™ RED DOT™ MONITORING ELECTRODE WITH FOAM TAPE AND STICKY GEL, 50/BAG, 20/CASE, 72/PLT 2570: Brand: RED DOT™

## (undated) DEVICE — CELL SAVER TUBING BRAT

## (undated) DEVICE — SUTURE VICRYL 3-0 PS-1

## (undated) DEVICE — GOWN SURG AERO CHROME XXL

## (undated) DEVICE — TOWEL: OR BLU 80/CS: Brand: MEDICAL ACTION INDUSTRIES

## (undated) DEVICE — SUTURE VICRYL 3-0 CT-1

## (undated) DEVICE — GAUZE SPONGES,12 PLY: Brand: CURITY

## (undated) DEVICE — 3M™ BAIR HUGGER® CARDIAC ACCESS BLANKET, 5 PER CASE 63000: Brand: BAIR HUGGER™

## (undated) DEVICE — HEMOCLIP HORIZON SM MULTI

## (undated) DEVICE — SUTURE VICRYL 5-0 P-3

## (undated) DEVICE — SUTURE PROLENE 3-0 SH

## (undated) DEVICE — DERMABOND LIQUID ADHESIVE

## (undated) DEVICE — TRAY FOLEY 16F IC URINMETER

## (undated) DEVICE — BLADE ELECTRODE: Brand: EDGE

## (undated) DEVICE — SUTURE PROLENE 3-0 V-7

## (undated) DEVICE — HEMOCLIP HORIZON LG 004200

## (undated) DEVICE — ENDOSCOPY PACK - LOWER: Brand: MEDLINE INDUSTRIES, INC.

## (undated) NOTE — LETTER
[facility]  Huy Franklin 59 [address] [facility], [address] [place] [address]    Consent for Operation    Date: __________________    Time: _______________    1.  I authorize the performance upon [address] the following operation:    Procedure(s):  right iliac padron revealed by the pictures or by descriptive texts accompanying them. If the procedure has been videotaped, the surgeon will obtain the original videotape. The hospital will not be responsible for storage or maintenance of this tape.     6. For the purpose of THAT MY DOCTOR PROVIDED ME WITH THE ABOVE EXPLANATIONS, THAT ALL BLANKS OR STATEMENTS REQUIRING INSERTION OR COMPLETION WERE FILLED IN.     Signature of Patient:   ___________________________    When the patient is a minor or mentally incompetent to give co · All of the medicines I take (including prescriptions, herbal supplements, and pills I can buy without a prescription (including street drugs/illegal medications).  Failure to inform my anesthesiologist about these medicines may increase my risk of anesthe _____________________________________________________________________________  Anesthesiologist Signature     Date   Time  I have discussed the procedure and information above with the patient (or patient’s representative) and answered their questions.  The

## (undated) NOTE — IP AVS SNAPSHOT
[facility] [place] [address] [place], [address] ~ [phone]                Discharge Summary   1/9/2017    [address]           Admission Information        Provider Department    1/9/2017 Petrona Castillo MD  [id number] These medications were sent to [address], [address]  [address], [phone], [address] [facility] [address]     Phone:  [phone] - aspirin Neutrophil % Lymphocyte % Monocyte % Eosinophil % Basophil % Prelim Neut Abs Final Neut Abs Lymphocyte Abso Monocyte Absolu Eosinophil Abso Basophil Absolu    (01/10/17)  87.2 (01/10/17)  7.6 (01/10/17)  4.3 (01/10/17)  0.0 (01/10/17)  0.2 -- (01/10/17) can help with your Affordable Care Act coverage, as well as all types of Medicaid plans. To get signed up and covered, please call [phone] and ask to get set up for an insurance coverage that is in-network with [facility]ios Most common side effects: Constipation, drowsiness, dizziness, urinary retention (inability to urinate)   What to report to your healthcare team: Difficulty urinating, dizziness, no bowel movement in 2+ days, unresolved pain           Non-Narcotic Pain Med

## (undated) NOTE — IP AVS SNAPSHOT
[facility] [place] [address] [place], [address] ~ [phone]                Discharge Summary   2/27/2017    [address]           Admission Information        Provider Department    2/27/2017 Gris Mendez MD  [id number] Commonly known as:  Matilde Tate   Next dose due:  As needed        Take 1 tablet by mouth every 6 (six) hours as needed.     Nicolle Erickson                             STOP taking these medications     aspirin 81 MG Tbec   Notes to Patient:  Stop taking the baby 14.7 (02/27/17)  44.4 (02/27/17)  94.7    (02/27/17)  255.0       Recent Hematology Lab Results (cont.)  (Last 3 results in the past 90 days)    Neutrophil % Lymphocyte % Monocyte % Eosinophil % Basophil % Prelim Neut Abs Final Neut Abs Lymphocyte Abso Mon harming yourself, contact [facility] at [phone]. - If you don’t have insurance, Crista Pappas has partnered with Patient [address] to help you get signed up for insurance coverage.   Patient [place] retention (inability to urinate)   What to report to your healthcare team: Difficulty urinating, dizziness, no bowel movement in 2+ days, unresolved pain           Non-Narcotic Pain Medications     ibuprofen 800 MG Oral Tab       Use: Treat pain, fever, in

## (undated) NOTE — LETTER
[address] [facility] [place] FOR BLOOD OR BLOOD COMPONENTS      In the course of your treatment, it may become necessary to administer a transfusion of blood or blood components.  This form provides basic information concerning this proc explain the alternatives to you if it has not already been done. I,Jasen Felix, have read/had read to me the above. I understand the matters bearing on the decision whether or not to authorize a transfusion of blood or blood components.  I have no

## (undated) NOTE — LETTER
[facility]  Huy Franklin 59 [address] [facility], [address] [place] [address]    Consent for Operation    Date: __________________    Time: _______________    1.  I authorize the performance upon [address] the following operation:    Procedure(s):  Endovascular ao procedure has been videotaped, the surgeon will obtain the original videotape. The hospital will not be responsible for storage or maintenance of this tape.     6. For the purpose of advancing medical education, I consent to the admittance of observers to t STATEMENTS REQUIRING INSERTION OR COMPLETION WERE FILLED IN.     Signature of Patient:   ___________________________    When the patient is a minor or mentally incompetent to give consent:  Signature of person authorized to consent for patient: ____________ supplements, and pills I can buy without a prescription (including street drugs/illegal medications). Failure to inform my anesthesiologist about these medicines may increase my risk of anesthetic complications.   · If I am allergic to anything or have had Anesthesiologist Signature     Date   Time  I have discussed the procedure and information above with the patient (or patient’s representative) and answered their questions. The patient or their representative has agreed to have anesthesia services.     ___